# Patient Record
Sex: FEMALE | Race: WHITE | NOT HISPANIC OR LATINO | Employment: OTHER | URBAN - METROPOLITAN AREA
[De-identification: names, ages, dates, MRNs, and addresses within clinical notes are randomized per-mention and may not be internally consistent; named-entity substitution may affect disease eponyms.]

---

## 2018-07-07 ENCOUNTER — HOSPITAL ENCOUNTER (EMERGENCY)
Facility: HOSPITAL | Age: 63
Discharge: HOME | End: 2018-07-07
Attending: EMERGENCY MEDICINE
Payer: MEDICAID

## 2018-07-07 VITALS
TEMPERATURE: 99 F | SYSTOLIC BLOOD PRESSURE: 112 MMHG | OXYGEN SATURATION: 98 % | WEIGHT: 190 LBS | HEART RATE: 76 BPM | DIASTOLIC BLOOD PRESSURE: 56 MMHG | HEIGHT: 63 IN | RESPIRATION RATE: 16 BRPM | BODY MASS INDEX: 33.66 KG/M2

## 2018-07-07 DIAGNOSIS — R41.82 ALTERED MENTAL STATUS: ICD-10-CM

## 2018-07-07 DIAGNOSIS — S32.010D COMPRESSION FRACTURE OF L1 LUMBAR VERTEBRA WITH ROUTINE HEALING: ICD-10-CM

## 2018-07-07 DIAGNOSIS — F11.929: Primary | ICD-10-CM

## 2018-07-07 LAB
ALBUMIN SERPL BCP-MCNC: 3.4 G/DL
ALP SERPL-CCNC: 99 U/L
ALT SERPL W/O P-5'-P-CCNC: 20 U/L
AMMONIA PLAS-SCNC: 55 UMOL/L
ANION GAP SERPL CALC-SCNC: 8 MMOL/L
AST SERPL-CCNC: 30 U/L
BASOPHILS # BLD AUTO: 0.03 K/UL
BASOPHILS NFR BLD: 0.4 %
BILIRUB SERPL-MCNC: 0.6 MG/DL
BILIRUB UR QL STRIP: NEGATIVE
BUN SERPL-MCNC: 30 MG/DL
CALCIUM SERPL-MCNC: 9.6 MG/DL
CHLORIDE SERPL-SCNC: 103 MMOL/L
CLARITY UR: CLEAR
CO2 SERPL-SCNC: 27 MMOL/L
COLOR UR: YELLOW
CREAT SERPL-MCNC: 1.3 MG/DL
DIFFERENTIAL METHOD: ABNORMAL
EOSINOPHIL # BLD AUTO: 0.3 K/UL
EOSINOPHIL NFR BLD: 3.7 %
ERYTHROCYTE [DISTWIDTH] IN BLOOD BY AUTOMATED COUNT: 15.3 %
EST. GFR  (AFRICAN AMERICAN): 51 ML/MIN/1.73 M^2
EST. GFR  (NON AFRICAN AMERICAN): 44 ML/MIN/1.73 M^2
GLUCOSE SERPL-MCNC: 133 MG/DL
GLUCOSE UR QL STRIP: NEGATIVE
HCT VFR BLD AUTO: 33.7 %
HGB BLD-MCNC: 11.1 G/DL
HGB UR QL STRIP: NEGATIVE
KETONES UR QL STRIP: NEGATIVE
LACTATE SERPL-SCNC: 1.1 MMOL/L
LEUKOCYTE ESTERASE UR QL STRIP: NEGATIVE
LYMPHOCYTES # BLD AUTO: 2.2 K/UL
LYMPHOCYTES NFR BLD: 26.5 %
MAGNESIUM SERPL-MCNC: 2.3 MG/DL
MCH RBC QN AUTO: 28.9 PG
MCHC RBC AUTO-ENTMCNC: 32.9 G/DL
MCV RBC AUTO: 88 FL
MICROSCOPIC COMMENT: ABNORMAL
MONOCYTES # BLD AUTO: 0.7 K/UL
MONOCYTES NFR BLD: 8.9 %
NEUTROPHILS # BLD AUTO: 5.1 K/UL
NEUTROPHILS NFR BLD: 60.5 %
NITRITE UR QL STRIP: NEGATIVE
PH UR STRIP: 5 [PH] (ref 5–8)
PLATELET # BLD AUTO: 169 K/UL
PMV BLD AUTO: 10.7 FL
POTASSIUM SERPL-SCNC: 4.7 MMOL/L
PROT SERPL-MCNC: 7.9 G/DL
PROT UR QL STRIP: NEGATIVE
RBC # BLD AUTO: 3.84 M/UL
SODIUM SERPL-SCNC: 138 MMOL/L
SP GR UR STRIP: 1.02 (ref 1–1.03)
SQUAMOUS #/AREA URNS HPF: ABNORMAL /HPF
T4 FREE SERPL-MCNC: 1.25 NG/DL
TSH SERPL DL<=0.005 MIU/L-ACNC: 0.34 UIU/ML
URN SPEC COLLECT METH UR: NORMAL
UROBILINOGEN UR STRIP-ACNC: NEGATIVE EU/DL
WBC # BLD AUTO: 8.34 K/UL
WBC #/AREA URNS HPF: 3 /HPF (ref 0–5)
YEAST URNS QL MICRO: ABNORMAL

## 2018-07-07 PROCEDURE — 83735 ASSAY OF MAGNESIUM: CPT

## 2018-07-07 PROCEDURE — 93005 ELECTROCARDIOGRAM TRACING: CPT

## 2018-07-07 PROCEDURE — 84443 ASSAY THYROID STIM HORMONE: CPT

## 2018-07-07 PROCEDURE — 81000 URINALYSIS NONAUTO W/SCOPE: CPT

## 2018-07-07 PROCEDURE — 83605 ASSAY OF LACTIC ACID: CPT

## 2018-07-07 PROCEDURE — 84439 ASSAY OF FREE THYROXINE: CPT

## 2018-07-07 PROCEDURE — 82140 ASSAY OF AMMONIA: CPT

## 2018-07-07 PROCEDURE — 85025 COMPLETE CBC W/AUTO DIFF WBC: CPT

## 2018-07-07 PROCEDURE — 93010 ELECTROCARDIOGRAM REPORT: CPT | Mod: ,,, | Performed by: INTERNAL MEDICINE

## 2018-07-07 PROCEDURE — 80053 COMPREHEN METABOLIC PANEL: CPT

## 2018-07-07 PROCEDURE — 99285 EMERGENCY DEPT VISIT HI MDM: CPT | Mod: 25

## 2018-07-07 RX ORDER — NAPROXEN SODIUM 220 MG/1
81 TABLET, FILM COATED ORAL DAILY
COMMUNITY

## 2018-07-07 RX ORDER — SPIRONOLACTONE 100 MG/1
100 TABLET, FILM COATED ORAL DAILY
COMMUNITY

## 2018-07-07 RX ORDER — BACLOFEN 20 MG/1
20 TABLET ORAL 3 TIMES DAILY
COMMUNITY

## 2018-07-07 RX ORDER — SIMVASTATIN 40 MG/1
40 TABLET, FILM COATED ORAL NIGHTLY
COMMUNITY

## 2018-07-07 RX ORDER — INSULIN GLARGINE 100 [IU]/ML
INJECTION, SOLUTION SUBCUTANEOUS NIGHTLY
COMMUNITY

## 2018-07-07 RX ORDER — CITALOPRAM 20 MG/1
20 TABLET, FILM COATED ORAL DAILY
COMMUNITY
End: 2020-01-16

## 2018-07-07 RX ORDER — ERGOCALCIFEROL 1.25 MG/1
50000 CAPSULE ORAL
COMMUNITY

## 2018-07-07 RX ORDER — INSULIN LISPRO 100 [IU]/ML
INJECTION, SOLUTION INTRAVENOUS; SUBCUTANEOUS
COMMUNITY

## 2018-07-07 RX ORDER — MORPHINE SULFATE 30 MG/1
30 TABLET ORAL EVERY 4 HOURS PRN
COMMUNITY
End: 2018-07-07 | Stop reason: ALTCHOICE

## 2018-07-07 RX ORDER — DICYCLOMINE HYDROCHLORIDE 10 MG/1
10 CAPSULE ORAL
COMMUNITY

## 2018-07-07 RX ORDER — HYDROMORPHONE HYDROCHLORIDE 2 MG/1
2 TABLET ORAL EVERY 4 HOURS PRN
Qty: 8 TABLET | Refills: 0 | Status: SHIPPED | OUTPATIENT
Start: 2018-07-07

## 2018-07-07 RX ORDER — FUROSEMIDE 40 MG/1
40 TABLET ORAL 2 TIMES DAILY
COMMUNITY

## 2018-07-07 RX ORDER — LACTULOSE 10 G/15ML
SOLUTION ORAL; RECTAL 3 TIMES DAILY
COMMUNITY

## 2018-07-07 NOTE — ED NOTES
Dr. Rawls at bedside, attempted to assist pt out of bed and ambulate. Unsuccessful attempt, pt unable to ambulate. Gait unsteady, family at bedside. Pt assisted back into bed. Cardiac monitor placed.

## 2018-07-07 NOTE — DISCHARGE INSTRUCTIONS
Please begin your sustained release morphine preparation as written at the pharmacy, by .  Please discontinue the 30 mg tablets.  Please use the hydromorphone as needed for breakthrough pain on the new to 15 mg sustained release morphine therapeutic regimen.  Return immediately if she gets worse or if new problems develop.  Rest.

## 2018-07-07 NOTE — ED TRIAGE NOTES
63 y.o. Female presents to the ED with chief complaint of chronic neck and back pain. Per , patient was at [facility] yesterday with no success of pain management. Per EMS, patient took 30 mg of Morphine at home and feels weak. Patient awake and answering questions appropriately with slowed reaction. Per , patient has not been walking for 2 days with associated neuropathy. Patient denies recent injury. Patient resting in bed in [facility].

## 2018-07-07 NOTE — ED NOTES
Spoke with Kaia Car, he will look and fax.   43 y/o F pt with no PMHx and no PSHx presents to ED c/o RLQ abd pain for several days. Pt describes abd pain as constant, and waxing and waning; pt reports no aggravating factors however RLQ abd pain is improved with Motrin. Pt visited both PMD and OB/GYN earlier today and was sent to ED for further evaluation. Pt also reports associated lack of appetite and nausea. Pt notes questionable diarrhea/loose stool. Pt denies fever, chills, vomiting, vaginal bleeding, constipation, dysuria, hematuria, burning with urination, urinary retention, or any other complaints. Pt also denies having had similar episodes of RLQ abd pain in the past. LMP: x9 days ago. NKDA.

## 2018-07-07 NOTE — ED PROVIDER NOTES
"Encounter Date: 7/7/2018    SCRIBE #1 NOTE: I, Frantz Porter, am scribing for, and in the presence of,  Huan Rawls MD. I have scribed the following portions of the note - Other sections scribed: HPI, ROS.       History     Chief Complaint   Patient presents with    Back Pain     complaints of neck and back pain.  took 30 mg Morphine and 2 Baclofen this am. Sees Dr. Banks for pain mgt.   Was seen yesterday at , they gave Narcan.  EMS states pt ambulated w/ assistance to stretcher.  Pt presents to ED on EMS stretcher awake but slow to respond.  Pt will talk with painful stemuli.       CC: Back Pain; Neck Pain    HPI: This 63 y.o female with PMHx Chronic back pain, Cirrhosis, DM and PSHx Back surgery presents to the ED via EMS for an evaluation of chronic back and neck pain. Per EMS, pt was seen at  yesterday, and was given Narcan as tx. They also report pt has taken 30mg Morphine and x2 Baclofen this morning as tx for her sx. Per pt's spouse, pt had x1 fall episode in May, and has been intermittently visiting  since June for her sx. He reports pt is usually ambulatory home until x2 days ago. Pt's spouse also reports pt has sx of intermittent fevers, and had x1 HA episode yesterday. He notes pt is medically compliant, has been using Morphine for x10 years, changed from Norco in the past, and also mentions pt's sx of twitching x5 years.  No modifying factors. Otherwise, pt denies SOB, CP, abdominal pain, and N/V/D.       The history is provided by the patient, the spouse and the EMS personnel. No  was used.     Review of patient's allergies indicates:   Allergen Reactions    Lyrica [pregabalin] Other (See Comments)     "it makes her crazy" per spouse    Mushroom     Protonix [pantoprazole] Other (See Comments)     "it makes her crazy" per spouse.     Past Medical History:   Diagnosis Date    Chronic back pain 07/07/2018    Cirrhosis 07/07/2018    Diabetes mellitus      Past Surgical " History:   Procedure Laterality Date    BACK SURGERY       History reviewed. No pertinent family history.  Social History   Substance Use Topics    Smoking status: Former Smoker    Smokeless tobacco: Never Used    Alcohol use No     Review of Systems   Constitutional: Positive for fever (+) intermittent. Negative for chills.   HENT: Negative for congestion, ear pain, rhinorrhea and sore throat.    Eyes: Negative for pain and visual disturbance.   Respiratory: Negative for cough and shortness of breath.    Cardiovascular: Positive for leg swelling. Negative for chest pain.   Gastrointestinal: Negative for abdominal pain, diarrhea, nausea and vomiting.   Genitourinary: Negative for dysuria.   Musculoskeletal: Positive for back pain and neck pain.   Skin: Negative for rash.   Neurological: Negative for headaches.   All other systems reviewed and are negative.      Physical Exam     Initial Vitals [07/07/18 0758]   BP Pulse Resp Temp SpO2   (!) 175/75 78 14 97.3 °F (36.3 °C) 99 %      MAP       --         Physical Exam  The patient was examined specifically for the following:   General:No significant distress, Good color, Warm and dry. Head and neck:Scalp atraumatic, Neck supple. Neurological:Appropriate conversation, Gross motor deficits. Eyes:Conjugate gaze, Clear corneas. ENT: No epistaxis. Cardiac: Regular rate and rhythm, Grossly normal heart tones. Pulmonary: Wheezing, Rales. Gastrointestinal: Abdominal tenderness, Abdominal distention. Musculoskeletal: Extremity deformity, Apparent pain with range of motion of the joints. Skin: Rash.   The findings on examination were normal except for the following:  Patient is slow to respond.  That time she seems confused.  She will converse appropriately.  Her eyes are wide open.  She will tract examiner with some stimulation.  Cranial nerves, motor and sensory examination grossly unremarkable.  The lungs are clear the heart tones are normal the abdomen is nontender there  is mild tenderness of the lumbar back.  We can sit the patient upright in the stretcher.  Patient will move her neck spontaneously without complaint.  The patient is oriented to being at [place].  There is no jaundice.  The there is no ascites.  The patient has a midline ventral hernia that is easily reducible.  It is a large defect.  ED Course   Procedures  Labs Reviewed   COMPREHENSIVE METABOLIC PANEL - Abnormal; Notable for the following:        Result Value    Glucose 133 (*)     BUN, Bld 30 (*)     Albumin 3.4 (*)     eGFR if  51 (*)     eGFR if non  44 (*)     All other components within normal limits   CBC W/ AUTO DIFFERENTIAL - Abnormal; Notable for the following:     RBC 3.84 (*)     Hemoglobin 11.1 (*)     Hematocrit 33.7 (*)     RDW 15.3 (*)     All other components within normal limits   TSH - Abnormal; Notable for the following:     TSH 0.343 (*)     All other components within normal limits   AMMONIA - Abnormal; Notable for the following:     Ammonia 55 (*)     All other components within normal limits   URINALYSIS MICROSCOPIC - Abnormal; Notable for the following:     Yeast, UA Many (*)     All other components within normal limits    Narrative:     Preferred Collection Type->Urine, Clean Catch   MAGNESIUM   URINALYSIS, REFLEX TO URINE CULTURE    Narrative:     Preferred Collection Type->Urine, Clean Catch   LACTIC ACID, PLASMA   T4, FREE     EKG Readings: (Independently Interpreted)   This patient is in a normal sinus rhythm with a heart rate of 79.  The p.r. QRS and QT intervals are normal.  There is no evidence of acute myocardial infarction or malignant arrhythmia.       Imaging Results          CT Head Without Contrast (Final result)  Result time 07/07/18 09:15:33    Final result by Rachele Roman MD (07/07/18 09:15:33)                 Impression:      Unremarkable noncontrast CT head specifically without evidence for acute intracranial hemorrhage.   Clinical correlation and further evaluation as warranted.      Electronically signed by: Rachele Roman MD  Date:    07/07/2018  Time:    09:15             Narrative:    EXAMINATION:  CT HEAD WITHOUT CONTRAST    CLINICAL HISTORY:  Altered mental status;  Altered mental status, unspecified    TECHNIQUE:  Multiple sequential 5 mm axial images of the head without contrast.  Coronal and sagittal reformatted imaging from the axial acquisition.    COMPARISON:  None    FINDINGS:  There is no evidence for acute intracranial hemorrhage or sulcal effacement.  The ventricles are normal in size without hydrocephalus.  There is no midline shift or mass effect.  Visualized paranasal sinuses and mastoid air cells are clear.                               CT Lumbar Spine Without Contrast (Final result)  Result time 07/07/18 09:39:35    Final result by Rachele Roman MD (07/07/18 09:39:35)                 Impression:      Age-indeterminate compression fracture of L1 with loss of approximately 40% superior vertebral body height and retropulsion of the superior endplate into the spinal canal by approximately 6 mm.  Comparison with prior imaging would be beneficial.    Postoperative changes of the L4 and L5 level consisting of posterior decompression.    Multilevel degenerative changes of the lumbar spine contributing to central canal stenosis and neural foraminal narrowing as detailed in the above level by level description.      Electronically signed by: Rachele Roman MD  Date:    07/07/2018  Time:    09:39             Narrative:    EXAMINATION:  CT LUMBAR SPINE WITHOUT CONTRAST    CLINICAL HISTORY:  Low back pain, prior surgery, new or progressive sx;    TECHNIQUE:  Low-dose axial, sagittal and coronal reformations are obtained through the lumbar spine.  Contrast was not administered.    COMPARISON:  None.    FINDINGS:  Collateral vessels are noted in the left upper abdomen.  Calcified atheromatous disease affects the aorta and  its branch vessels.  Epidural stimulator device is partially imaged.  It enters the spine at the T12-L1 level.    There is an age-indeterminate compression fracture of L1 with loss of approximately 40% superior vertebral body height.  There is retropulsion of the superior endplate into the spinal canal by approximately 6 mm.  Remaining vertebral body heights are within normal limits.  There is disc space narrowing at L4-5.  Remaining disc spaces are within normal limits.    At T12-L1, there is retropulsion of the superior endplate of L1 into the spinal canal with a broad-based posterior disc bulge and facet arthropathy contributing to mild-to-moderate central canal stenosis and mild bilateral neural foraminal narrowing, worse on the left.    At L1-2, there is a broad-based posterior disc bulge and facet arthropathy contributing to mild-to-moderate central canal stenosis with mild left and moderate right-sided neural foraminal narrowing.    At L2-3, there is a broad-based posterior disc bulge and facet arthropathy contributing to mild central canal stenosis with mild bilateral neural foraminal narrowing.    At L3-4, there is a broad-based posterior disc bulge and facet arthropathy contributing to moderate central canal stenosis with mild bilateral neural foraminal narrowing.    At L4-5, posterior decompression is noted.  There is bilateral facet arthropathy.  No central canal stenosis.  Moderate right-sided neural foraminal narrowing.    At L5-S1, posterior decompression is noted.  There is bilateral facet arthropathy contributing to at most mild bilateral neural foraminal narrowing.                               X-Ray Chest AP Portable (Final result)  Result time 07/07/18 08:48:31    Final result by Rachele Roman MD (07/07/18 08:48:31)                 Impression:      Mild pulmonary vascular congestion and cardiomegaly.      Electronically signed by: Rachele Roman MD  Date:    07/07/2018  Time:    08:48              Narrative:    EXAMINATION:  XR CHEST AP PORTABLE    CLINICAL HISTORY:  chest pain;    TECHNIQUE:  Single frontal view of the chest was performed.    COMPARISON:  None    FINDINGS:  The heart is enlarged.  Calcified atheromatous disease affects the aorta.  There is mild central pulmonary vascular congestion.  No consolidation or pleural effusions.  Cervical fusion hardware is visualized.  Leads from an epidural stimulator device travel of the thoracic spine.  Age-appropriate degenerative changes affect the skeleton.                              Medical decision making:  Given the above, this patient presents to the emergency room with mental status depression.  There at [place] yesterday after the patient was treated with Narcan.  The patient had immediate difficulty with withdrawal for medicine.  The family was angry about this.  They present today for another opinion about the patient's somnolence and inability to walk.  I can wake this patient off.  I can have conversations with her.  She is confused.  The family relates that she has had baseline other than looking fuzzy.  She has occasional myoclonic jerks.  She has good oxygen saturations.  She has been written a new prescription by her pain management doctor, Dr. cortez, who has moved her to morphine same believes, 15 mg at night before bed.  The pharmacy could not fill these medicines because they did not have an adequate supply.  I was able to arrange for the family to be able to  40 of her 60 tablets prescription.  We will discontinue the morphine 30s.  I discussed this case with  specifically with regard to the patient's abnormal CT scan with findings at L1.  He did not feel that there was compromise of the canal.  The patient had some mild findings suggesting some minimal dehydration but at the same time she has pedal edema. I thought that it was in her best interest to not treat her with IV fluids.  We will have her follow  up with her pain management doctor after the changes made.  I will write her a small number of hydromorphone tablets to cover her in the event that she has a little breakthrough pain. I will avoid Tylenol because of her history of cirrhosis.  I could not find another answer for her mental status changes besides narcotic intoxication.  I do not think the patient is septic ill or toxic.  I doubt structural brain lesions and stroke.  The case was discussed with , internal medicine who agrees with the disposition and plan.                Scribe Attestation:   Scribe #1: I performed the above scribed service and the documentation accurately describes the services I performed. I attest to the accuracy of the note.    Attending Attestation:           Physician Attestation for Scribe:  Physician Attestation Statement for Scribe #1: I, Huan Rawls MD, reviewed documentation, as scribed by Frantz Porter in my presence, and it is both accurate and complete.                    Clinical Impression:   The primary encounter diagnosis was Acute narcotic intoxication, with unspecified complication. A diagnosis of Altered mental status was also pertinent to this visit.                             Huan Rawls MD  07/07/18 2430

## 2018-09-17 ENCOUNTER — HOSPITAL ENCOUNTER (EMERGENCY)
Facility: HOSPITAL | Age: 63
Discharge: HOME | End: 2018-09-17
Attending: EMERGENCY MEDICINE
Payer: MEDICAID

## 2018-09-17 VITALS
HEIGHT: 61 IN | RESPIRATION RATE: 20 BRPM | TEMPERATURE: 98 F | WEIGHT: 204 LBS | HEART RATE: 64 BPM | DIASTOLIC BLOOD PRESSURE: 61 MMHG | BODY MASS INDEX: 38.51 KG/M2 | OXYGEN SATURATION: 97 % | SYSTOLIC BLOOD PRESSURE: 128 MMHG

## 2018-09-17 DIAGNOSIS — D64.9 ANEMIA, UNSPECIFIED TYPE: Primary | ICD-10-CM

## 2018-09-17 DIAGNOSIS — R07.9 CHEST PAIN: ICD-10-CM

## 2018-09-17 LAB
ALBUMIN SERPL BCP-MCNC: 3 G/DL
ALP SERPL-CCNC: 102 U/L
ALT SERPL W/O P-5'-P-CCNC: 10 U/L
ANION GAP SERPL CALC-SCNC: 5 MMOL/L
AST SERPL-CCNC: 20 U/L
BASOPHILS # BLD AUTO: 0.01 K/UL
BASOPHILS NFR BLD: 0.2 %
BILIRUB SERPL-MCNC: 0.3 MG/DL
BNP SERPL-MCNC: 72 PG/ML
BUN SERPL-MCNC: 12 MG/DL
CALCIUM SERPL-MCNC: 8.5 MG/DL
CHLORIDE SERPL-SCNC: 107 MMOL/L
CO2 SERPL-SCNC: 27 MMOL/L
CREAT SERPL-MCNC: 1.2 MG/DL
CTP QC/QA: YES
DIFFERENTIAL METHOD: ABNORMAL
EOSINOPHIL # BLD AUTO: 0.3 K/UL
EOSINOPHIL NFR BLD: 5 %
ERYTHROCYTE [DISTWIDTH] IN BLOOD BY AUTOMATED COUNT: 14.7 %
EST. GFR  (AFRICAN AMERICAN): 56 ML/MIN/1.73 M^2
EST. GFR  (NON AFRICAN AMERICAN): 49 ML/MIN/1.73 M^2
FECAL OCCULT BLOOD, POC: NEGATIVE
GLUCOSE SERPL-MCNC: 177 MG/DL
HCT VFR BLD AUTO: 27.5 %
HGB BLD-MCNC: 8.9 G/DL
LYMPHOCYTES # BLD AUTO: 1.7 K/UL
LYMPHOCYTES NFR BLD: 31.6 %
MCH RBC QN AUTO: 28.3 PG
MCHC RBC AUTO-ENTMCNC: 32.4 G/DL
MCV RBC AUTO: 88 FL
MONOCYTES # BLD AUTO: 0.4 K/UL
MONOCYTES NFR BLD: 7.7 %
NEUTROPHILS # BLD AUTO: 3 K/UL
NEUTROPHILS NFR BLD: 55.5 %
PLATELET # BLD AUTO: 134 K/UL
PMV BLD AUTO: 11.6 FL
POTASSIUM SERPL-SCNC: 4.7 MMOL/L
PROT SERPL-MCNC: 7 G/DL
RBC # BLD AUTO: 3.14 M/UL
SODIUM SERPL-SCNC: 139 MMOL/L
TROPONIN I SERPL DL<=0.01 NG/ML-MCNC: 0.01 NG/ML
TROPONIN I SERPL DL<=0.01 NG/ML-MCNC: <0.006 NG/ML
WBC # BLD AUTO: 5.44 K/UL

## 2018-09-17 PROCEDURE — 85025 COMPLETE CBC W/AUTO DIFF WBC: CPT

## 2018-09-17 PROCEDURE — 84484 ASSAY OF TROPONIN QUANT: CPT

## 2018-09-17 PROCEDURE — 80053 COMPREHEN METABOLIC PANEL: CPT

## 2018-09-17 PROCEDURE — 93010 ELECTROCARDIOGRAM REPORT: CPT | Mod: ,,, | Performed by: INTERNAL MEDICINE

## 2018-09-17 PROCEDURE — 83880 ASSAY OF NATRIURETIC PEPTIDE: CPT

## 2018-09-17 PROCEDURE — 99284 EMERGENCY DEPT VISIT MOD MDM: CPT

## 2018-09-17 PROCEDURE — 93005 ELECTROCARDIOGRAM TRACING: CPT

## 2018-09-17 RX ORDER — MIDAZOLAM HYDROCHLORIDE 1 MG/ML
2 INJECTION INTRAMUSCULAR; INTRAVENOUS
Status: DISCONTINUED | OUTPATIENT
Start: 2018-09-17 | End: 2018-09-17

## 2018-09-18 NOTE — ED TRIAGE NOTES
"Patient presents to the ED via personal transportation with . Patient reports that she went to see her PCP today and red blood cell was low. Patient is also sob and chest pain. Sob x "2-3 weeks ago". Chest pain has also been going on for "2-3 weeks". Patient reports a 6/10, mid sternal, intermittent, pressure-like chest pain that does not radiate and gets exertion. Patient also reports vomiting episodes that is chronic.  "

## 2018-09-18 NOTE — ED TRIAGE NOTES
Presented to ED with c/o  midsternal CP, SOB, and abnormal lab results. Stated she went to her PCP today to hear her results about the H/H she had last week. Her PCP told her to come to an emergency room. Says she's noticed she's even more shortwinded when she's active. Normally wears 2L home 02.  Pressure-like CP does not radiate anywhere. Denies nausea associated with cp, but c/o dizziness.

## 2018-09-18 NOTE — DISCHARGE INSTRUCTIONS
"Return to the Emergency Department of any acute worsening of your symptoms or for any other concern.     You should return to the ED for fever/chills, shortness of breath, chest pain, weakness or "passing out".     Pt should take all medications as prescribed.    Pt should follow up with PCP as soon as possible.    The risks associated with not taking your medications as prescribed and not following up with your Primary Care doctor or sub specialist includes worsening of your condition, pain, disability, loss of function or livelihood, and death      GEORGI Dobbs M.D. 11:16 PM 9/17/2018      Our goal in the emergency department is to always give you outstanding care and exceptional service. You may receive a survey by mail or e-mail in the next week regarding your experience in our ED. We would greatly appreciate your completing and returning the survey. Your feedback provides us with a way to recognize our staff who give very good care and it helps us learn how to improve when your experience was below our aspiration of excellence.     "

## 2018-09-18 NOTE — ED PROVIDER NOTES
"Encounter Date: 9/17/2018    SCRIBE #1 NOTE: I, Rodríguez Dial, am scribing for, and in the presence of,  Bhavin Dobbs MD. I have scribed the following portions of the note - Other sections scribed: HPI and ROS.       History     Chief Complaint   Patient presents with    Chest Pain     Pt reports midsternal CP that began 3-4 weeks, pt stated hat she feels like someone is "standing on her chest"    Abnormal Lab     pt stated that PCP told her her RBC count was low and wanted her to be evaluated in ED     CC: Palpitations and Abnormal Lab     HPI: This 63 y.o F with Chronic back pain, Cirrhosis, and DM presents to the ED for emergent evaluation of an abnormal lab. She was told to come to the ED by her PCP for low RBC. Pt's most recent lab work was performed last week. Additionally, she c/o palpitations described as "chest pounding" and SOB with minimal exertion x3-4 weeks. She describes her episodes as "like someone is sitting on my chest." She also c/o constipation and bright red blood in stool which she suspects is secondary to straining. She is compliant with her Furosemide. She denies fever, chills, diaphoresis, nausea, emesis, diarrhea, abdominal pain, back pain, dysuria, hematuria and rash. No prior tx.         The history is provided by the patient and the spouse. No  was used.     Review of patient's allergies indicates:   Allergen Reactions    Lyrica [pregabalin] Other (See Comments)     "it makes her crazy" per spouse    Mushroom     Protonix [pantoprazole] Other (See Comments)     "it makes her crazy" per spouse.     Past Medical History:   Diagnosis Date    Chronic back pain 07/07/2018    Cirrhosis 07/07/2018    Diabetes mellitus      Past Surgical History:   Procedure Laterality Date    BACK SURGERY       History reviewed. No pertinent family history.  Social History     Tobacco Use    Smoking status: Former Smoker    Smokeless tobacco: Never Used   Substance Use " "Topics    Alcohol use: No    Drug use: No     Review of Systems   Constitutional: Negative for chills, diaphoresis and fever.   HENT: Negative for congestion, ear pain, rhinorrhea and sore throat.    Eyes: Negative for pain, redness and visual disturbance.   Respiratory: Positive for shortness of breath. Negative for cough.    Cardiovascular: Positive for palpitations ("pounding"). Negative for chest pain.   Gastrointestinal: Positive for blood in stool (bright red) and constipation. Negative for abdominal pain, diarrhea, nausea and vomiting.   Genitourinary: Negative for dysuria, frequency and urgency.   Musculoskeletal: Negative for back pain and neck pain.   Skin: Negative for rash.   Neurological: Negative for headaches.   Psychiatric/Behavioral: The patient is not nervous/anxious.        Physical Exam     Initial Vitals [09/17/18 1825]   BP Pulse Resp Temp SpO2   (!) 136/58 62 16 98.8 °F (37.1 °C) (!) 93 %      MAP       --         Physical Exam    Vitals reviewed.  Constitutional: She appears well-developed and well-nourished. She is Obese .   HENT:   Head: Normocephalic and atraumatic.   Nose: Nose normal.   Mouth/Throat: No oropharyngeal exudate.   Eyes: EOM are normal. Pupils are equal, round, and reactive to light.   Neck: Normal range of motion. Neck supple. No JVD present.   Cardiovascular: Regular rhythm and normal heart sounds. Exam reveals no gallop and no friction rub.    No murmur heard.  Pulmonary/Chest: Breath sounds normal. No stridor. No respiratory distress. She has no wheezes. She has no rhonchi. She has no rales. She exhibits no tenderness.   Abdominal: Soft. Bowel sounds are normal. She exhibits no distension and no mass. There is no tenderness. There is no rebound and no guarding.   Genitourinary: Rectal exam shows no external hemorrhoid, no internal hemorrhoid, no fissure, no mass, no tenderness, anal tone normal and guaiac negative stool. Guaiac negative stool. : " Acceptable.  Genitourinary Comments: Chaperoned rectal exam.  No sign of active bleeding.  No sign of hemorrhoids.  Light brown stool.  Occult testing negative.   Musculoskeletal: Normal range of motion. She exhibits no edema or tenderness.   Neurological: She is alert and oriented to person, place, and time. She has normal strength. No sensory deficit.   Skin: Skin is warm and dry.   Psychiatric: She has a normal mood and affect. Thought content normal.         ED Course   Procedures  Labs Reviewed   CBC W/ AUTO DIFFERENTIAL - Abnormal; Notable for the following components:       Result Value    RBC 3.14 (*)     Hemoglobin 8.9 (*)     Hematocrit 27.5 (*)     RDW 14.7 (*)     Platelets 134 (*)     All other components within normal limits   COMPREHENSIVE METABOLIC PANEL - Abnormal; Notable for the following components:    Glucose 177 (*)     Calcium 8.5 (*)     Albumin 3.0 (*)     Anion Gap 5 (*)     eGFR if  56 (*)     eGFR if non  49 (*)     All other components within normal limits   TROPONIN I   TROPONIN I   B-TYPE NATRIURETIC PEPTIDE   POCT OCCULT BLOOD STOOL     EKG Readings: (Independently Interpreted)   Initial Reading: No STEMI. Rhythm: Normal Sinus Rhythm. Ectopy: No Ectopy. Conduction: Normal. ST Segments: Normal ST Segments. T Waves: Normal. Clinical Impression: Normal Sinus Rhythm       Imaging Results          X-Ray Chest PA And Lateral (Final result)  Result time 09/17/18 21:10:47    Final result by Harvinder Block MD (09/17/18 21:10:47)                 Impression:      Enlarged cardiac silhouette without convincing radiographic evidence of failure or focal consolidation.      Electronically signed by: Harvinder Block MD  Date:    09/17/2018  Time:    21:10             Narrative:    EXAMINATION:  XR CHEST PA AND LATERAL    CLINICAL HISTORY:  Chest Pain;    TECHNIQUE:  PA and lateral views of the chest were performed.    COMPARISON:  Chest radiograph  07/07/2018    FINDINGS:  Monitoring leads overlie the chest.  Lower cervical ACDF and thoracic spinal stimulator noted.  Cardiac silhouette is enlarged without convincing evidence of failure.  Calcific atherosclerosis of the aortic arch.  Mediastinal contours and hilar regions are within normal limits.  The lungs are symmetrically well expanded without large consolidation, pleural effusion or pneumothorax.  Left basilar minimal platelike scarring versus atelectasis.  No acute osseous process seen.                              X-Rays:   Independently Interpreted Readings:   Chest X-Ray: No infiltrates.  No acute abnormalities. Cardiomegaly present.     Medical Decision Making:   History:   Old Medical Records: I decided to obtain old medical records.  Initial Assessment:   Medical decision-making:    The patient received a medical screening exam. If performed, the EKG was independently evaluated by me and is pending final cardiology evaluation.  If performed, all radiographic studies were independently evaluated by me and are pending final radiology evaluation. If labs were ordered, they were reviewed. Vital signs are independently assessed by me.  If performed, the pulse oximetry was independently evaluated by me.  I decided to obtain the patient's past medical record.  If available, I reviewed the patient's past medical record, including most recent labs and radiology reports.    ED Management:  Patient presents to emergency department for evaluation of chest pain and reported abnormal labs.  The patient has a normal EKG without evidence of ischemia.  This is been going on for 3-4 weeks.  I doubt acute myocardial infarction.  Troponins are negative x2.  There is no sign of severe vascular congestion or severe hypoxia or to suggest acute decompensated heart failure warranting admission for IV diuresis.  The family admits that they have not a did and hearing to a low-salt diet and have recently had a shrimp boil.   This is likely some volume overload creating some central congestion without any severe decompensation or peripheral vascular congestion.  Have encouraged her to adhere to a no salt diet and to take her Lasix as prescribed.  I do not think this is a pulmonary embolism.  No sign of DVT.  Patient did state that she was told by her primary care physician that she was somewhat anemic.  She has had a drop in her hemoglobin and hematocrit.  She states this is usually due to bright red blood when she has to strain to have a bowel movement.  I do not see any active sign of external or internal hemorrhoid.  Digital rectal exam showed light brown stool that was occult testing negative.  There is no other sign of bleeding diathesis.  She does not require blood transfusion.  She states that she has had polyps in the past on colonoscopy about a year ago.  This could be a diverticular polyp type bleed.  I will refer her back to Gastroenterology.  She does not require blood transfusion at this time.  Patient and family feels reassured and we discussed the importance of having low-salt diet and we discussed strict return precautions.  At the time of discharge patient is pain free and without any acute complaints.    The results and physical exam findings were reviewed with the patient. Pt agrees with assessment, disposition and treatment plan and has no further questions or complaints at this time.    GEORGI Dobbs M.D. 11:23 PM 9/17/2018              Scribe Attestation:   Scribe #1: I performed the above scribed service and the documentation accurately describes the services I performed. I attest to the accuracy of the note.    Attending Attestation:           Physician Attestation for Scribe:  Physician Attestation Statement for Scribe #1: I, Bhavin Dobbs MD, reviewed documentation, as scribed by Rodríguez Dial in my presence, and it is both accurate and complete.                    Clinical Impression:   The primary  encounter diagnosis was Anemia, unspecified type. A diagnosis of Atypical was also pertinent to this visit.                             Bhavin Dobbs MD  09/17/18 [id number]

## 2020-01-16 ENCOUNTER — HOSPITAL ENCOUNTER (EMERGENCY)
Facility: HOSPITAL | Age: 65
Discharge: HOME | End: 2020-01-16
Attending: INTERNAL MEDICINE
Payer: MEDICAID

## 2020-01-16 VITALS
RESPIRATION RATE: 20 BRPM | SYSTOLIC BLOOD PRESSURE: 167 MMHG | WEIGHT: 210 LBS | HEART RATE: 71 BPM | OXYGEN SATURATION: 96 % | DIASTOLIC BLOOD PRESSURE: 71 MMHG | BODY MASS INDEX: 39.68 KG/M2 | TEMPERATURE: 100 F

## 2020-01-16 DIAGNOSIS — I50.9 CHF (CONGESTIVE HEART FAILURE): ICD-10-CM

## 2020-01-16 DIAGNOSIS — J98.4 CHRONIC LUNG DISEASE: ICD-10-CM

## 2020-01-16 DIAGNOSIS — Z79.4 TYPE 2 DIABETES MELLITUS WITH HYPERGLYCEMIA, WITH LONG-TERM CURRENT USE OF INSULIN: ICD-10-CM

## 2020-01-16 DIAGNOSIS — E11.65 TYPE 2 DIABETES MELLITUS WITH HYPERGLYCEMIA, WITH LONG-TERM CURRENT USE OF INSULIN: ICD-10-CM

## 2020-01-16 DIAGNOSIS — J18.9 PNEUMONIA OF RIGHT LOWER LOBE DUE TO INFECTIOUS ORGANISM: Primary | ICD-10-CM

## 2020-01-16 DIAGNOSIS — R05.9 COUGH: ICD-10-CM

## 2020-01-16 LAB
ALBUMIN SERPL-MCNC: 3.2 G/DL (ref 3.3–5.5)
ALLENS TEST: ABNORMAL
ALP SERPL-CCNC: 94 U/L (ref 42–141)
BILIRUB SERPL-MCNC: 0.6 MG/DL (ref 0.2–1.6)
BUN SERPL-MCNC: 17 MG/DL (ref 7–22)
CALCIUM SERPL-MCNC: 8.3 MG/DL (ref 8–10.3)
CHLORIDE SERPL-SCNC: 107 MMOL/L (ref 98–108)
CREAT SERPL-MCNC: 0.9 MG/DL (ref 0.6–1.2)
CTP QC/QA: YES
GLUCOSE SERPL-MCNC: 143 MG/DL (ref 73–118)
HCO3 UR-SCNC: 26.7 MMOL/L (ref 24–28)
LDH SERPL L TO P-CCNC: 0.87 MMOL/L (ref 0.5–2.2)
PCO2 BLDA: 56.5 MMHG (ref 35–45)
PH SMN: 7.28 [PH] (ref 7.35–7.45)
PO2 BLDA: 125 MMHG (ref 40–60)
POC ALT (SGPT): 22 U/L (ref 10–47)
POC AST (SGOT): 38 U/L (ref 11–38)
POC B-TYPE NATRIURETIC PEPTIDE: 286 PG/ML (ref 0–100)
POC BE: -1 MMOL/L
POC CARDIAC TROPONIN I: 0 NG/ML
POC MOLECULAR INFLUENZA A AGN: NEGATIVE
POC MOLECULAR INFLUENZA B AGN: NEGATIVE
POC SATURATED O2: 98 % (ref 95–100)
POC TCO2: 26 MMOL/L (ref 18–33)
POC TCO2: 28 MMOL/L (ref 24–29)
POCT GLUCOSE: 173 MG/DL (ref 70–110)
POTASSIUM BLD-SCNC: 4.8 MMOL/L (ref 3.6–5.1)
PROTEIN, POC: 7.4 G/DL (ref 6.4–8.1)
SAMPLE: ABNORMAL
SAMPLE: NORMAL
SITE: ABNORMAL
SODIUM BLD-SCNC: 144 MMOL/L (ref 128–145)

## 2020-01-16 PROCEDURE — 80053 COMPREHEN METABOLIC PANEL: CPT | Mod: ER

## 2020-01-16 PROCEDURE — 96365 THER/PROPH/DIAG IV INF INIT: CPT | Mod: ER

## 2020-01-16 PROCEDURE — 96375 TX/PRO/DX INJ NEW DRUG ADDON: CPT | Mod: ER

## 2020-01-16 PROCEDURE — 87040 BLOOD CULTURE FOR BACTERIA: CPT

## 2020-01-16 PROCEDURE — 93010 EKG 12-LEAD: ICD-10-PCS | Mod: ,,, | Performed by: INTERNAL MEDICINE

## 2020-01-16 PROCEDURE — 84484 ASSAY OF TROPONIN QUANT: CPT | Mod: ER

## 2020-01-16 PROCEDURE — 93010 ELECTROCARDIOGRAM REPORT: CPT | Mod: ,,, | Performed by: INTERNAL MEDICINE

## 2020-01-16 PROCEDURE — 85025 COMPLETE CBC W/AUTO DIFF WBC: CPT | Mod: ER

## 2020-01-16 PROCEDURE — 63600175 PHARM REV CODE 636 W HCPCS: Mod: ER | Performed by: NURSE PRACTITIONER

## 2020-01-16 PROCEDURE — 82803 BLOOD GASES ANY COMBINATION: CPT | Mod: ER

## 2020-01-16 PROCEDURE — 25000242 PHARM REV CODE 250 ALT 637 W/ HCPCS: Mod: ER | Performed by: NURSE PRACTITIONER

## 2020-01-16 PROCEDURE — 94640 AIRWAY INHALATION TREATMENT: CPT | Mod: ER

## 2020-01-16 PROCEDURE — 87502 INFLUENZA DNA AMP PROBE: CPT | Mod: 59,ER

## 2020-01-16 PROCEDURE — 99285 EMERGENCY DEPT VISIT HI MDM: CPT | Mod: 25,ER

## 2020-01-16 PROCEDURE — 93005 ELECTROCARDIOGRAM TRACING: CPT | Mod: ER

## 2020-01-16 PROCEDURE — 83880 ASSAY OF NATRIURETIC PEPTIDE: CPT | Mod: ER

## 2020-01-16 RX ORDER — LEVOFLOXACIN 750 MG/1
750 TABLET ORAL DAILY
Qty: 5 TABLET | Refills: 0 | Status: SHIPPED | OUTPATIENT
Start: 2020-01-17 | End: 2020-01-22

## 2020-01-16 RX ORDER — IPRATROPIUM BROMIDE AND ALBUTEROL SULFATE 2.5; .5 MG/3ML; MG/3ML
3 SOLUTION RESPIRATORY (INHALATION)
Status: COMPLETED | OUTPATIENT
Start: 2020-01-16 | End: 2020-01-16

## 2020-01-16 RX ORDER — FUROSEMIDE 10 MG/ML
40 INJECTION INTRAMUSCULAR; INTRAVENOUS
Status: COMPLETED | OUTPATIENT
Start: 2020-01-16 | End: 2020-01-16

## 2020-01-16 RX ORDER — LEVOFLOXACIN 5 MG/ML
750 INJECTION, SOLUTION INTRAVENOUS
Status: COMPLETED | OUTPATIENT
Start: 2020-01-16 | End: 2020-01-16

## 2020-01-16 RX ORDER — BENZONATATE 100 MG/1
100 CAPSULE ORAL EVERY 8 HOURS PRN
Qty: 20 CAPSULE | Refills: 0 | Status: SHIPPED | OUTPATIENT
Start: 2020-01-16

## 2020-01-16 RX ADMIN — CEFTRIAXONE 1 G: 1 INJECTION, SOLUTION INTRAVENOUS at 04:01

## 2020-01-16 RX ADMIN — FUROSEMIDE 40 MG: 10 INJECTION, SOLUTION INTRAMUSCULAR; INTRAVENOUS at 04:01

## 2020-01-16 RX ADMIN — IPRATROPIUM BROMIDE AND ALBUTEROL SULFATE 3 ML: .5; 3 SOLUTION RESPIRATORY (INHALATION) at 04:01

## 2020-01-16 RX ADMIN — LEVOFLOXACIN 750 MG: 750 INJECTION, SOLUTION INTRAVENOUS at 04:01

## 2020-01-16 NOTE — ED PROVIDER NOTES
"Encounter Date: 1/16/2020    SCRIBE #1 NOTE: I, Prabhu Myers, am scribing for, and in the presence of,  Toussaint Battley, FNP. I have scribed the following portions of the note - Other sections scribed: HPI, ROS, PE.       History     Chief Complaint   Patient presents with    URI     Pt states, "Cough, congestion for several days."     The history is provided by the patient. No  was used.   Cough   This is a new problem. The current episode started several days ago. The problem occurs constantly. The problem has been unchanged. There has been no fever. Associated symptoms include wheezing. Pertinent negatives include no chest pain, no chills, no sweats, no weight loss, no headaches, no rhinorrhea, no sore throat, no myalgias, no shortness of breath and no eye redness. She has tried nothing for the symptoms. Risk factors: History of chronic lung disease.  Denies history of COPD. Past medical history comments: Patient was seen by her primary care provider on today who referred her to the ER for further evaluation for possible pneumonia..     Review of patient's allergies indicates:   Allergen Reactions    Lyrica [pregabalin] Other (See Comments)     "it makes her crazy" per spouse    Mushroom     Protonix [pantoprazole] Other (See Comments)     "it makes her crazy" per spouse.     Past Medical History:   Diagnosis Date    Chronic back pain 07/07/2018    Cirrhosis 07/07/2018    Diabetes mellitus      Past Surgical History:   Procedure Laterality Date    BACK SURGERY       History reviewed. No pertinent family history.  Social History     Tobacco Use    Smoking status: Former Smoker    Smokeless tobacco: Never Used   Substance Use Topics    Alcohol use: No    Drug use: No     Review of Systems   Constitutional: Negative.  Negative for appetite change, chills, diaphoresis, fever and weight loss.   HENT: Positive for congestion. Negative for dental problem, ear discharge, hearing loss, " mouth sores, postnasal drip, rhinorrhea, sinus pressure, sinus pain, sore throat and trouble swallowing.    Eyes: Negative.  Negative for pain, discharge, redness and itching.   Respiratory: Positive for cough and wheezing. Negative for shortness of breath.    Cardiovascular: Negative.  Negative for chest pain and palpitations.   Gastrointestinal: Negative.  Negative for abdominal distention, abdominal pain, constipation, diarrhea, nausea, rectal pain and vomiting.   Endocrine: Negative.    Genitourinary: Negative.  Negative for difficulty urinating, dyspareunia, dysuria, flank pain, hematuria, menstrual problem, vaginal bleeding, vaginal discharge and vaginal pain.   Musculoskeletal: Negative.  Negative for back pain, myalgias and neck pain.   Skin: Negative.  Negative for rash.   Allergic/Immunologic: Negative.    Neurological: Negative.  Negative for dizziness, syncope, facial asymmetry, speech difficulty, weakness, light-headedness and headaches.   Hematological: Negative.  Negative for adenopathy.   Psychiatric/Behavioral: Negative.  Negative for agitation, behavioral problems, dysphoric mood, hallucinations, self-injury, sleep disturbance and suicidal ideas.   All other systems reviewed and are negative.      Physical Exam     Initial Vitals [01/16/20 1454]   BP Pulse Resp Temp SpO2   (!) 156/67 61 16 99.5 °F (37.5 °C) (!) 94 %      MAP       --         Physical Exam    Nursing note and vitals reviewed.  Constitutional: Vital signs are normal. She appears well-developed and well-nourished.   HENT:   Head: Normocephalic and atraumatic.   Right Ear: External ear normal.   Left Ear: External ear normal.   Nose: Nose normal.   Mouth/Throat: Oropharynx is clear and moist and mucous membranes are normal.   Eyes: Conjunctivae are normal.   Neck: Normal range of motion. Neck supple.   Cardiovascular: Normal rate, regular rhythm, normal heart sounds, intact distal pulses and normal pulses. Exam reveals no gallop, no  friction rub and no decreased pulses.    No murmur heard.  Pulmonary/Chest: Effort normal. No respiratory distress. She has wheezes (Bilateral). She has rhonchi (Bilateral). She has rales (Bilateral). She exhibits no tenderness.   Mildly labored breathing noted.   Abdominal: Soft. Normal appearance and bowel sounds are normal. She exhibits no distension and no mass. There is no hepatosplenomegaly. There is no tenderness. There is no rigidity, no rebound and no guarding. No hernia.   Musculoskeletal:        Right lower leg: She exhibits edema (Bilateral lower extremities without erythema or erythematous streaking or pain. Nonpitting.). She exhibits no tenderness, no bony tenderness, no deformity and no laceration.   Neurological: She is alert and oriented to person, place, and time. No cranial nerve deficit. GCS score is 15. GCS eye subscore is 4. GCS verbal subscore is 5. GCS motor subscore is 6.   Skin: Skin is warm and dry. Capillary refill takes less than 2 seconds. No rash noted.   Psychiatric: She has a normal mood and affect.         ED Course   Procedures  Labs Reviewed   POCT GLUCOSE - Abnormal; Notable for the following components:       Result Value    POCT Glucose 173 (*)     All other components within normal limits   POCT CMP - Abnormal; Notable for the following components:    POC Glucose 143 (*)     All other components within normal limits   ISTAT PROCEDURE - Abnormal; Notable for the following components:    POC PH 7.283 (*)     POC PCO2 56.5 (*)     POC PO2 125 (*)     All other components within normal limits   POCT B-TYPE NATRIURETIC PEPTIDE (BNP) - Abnormal; Notable for the following components:    POC B-Type Natriuretic Peptide 286 (*)     All other components within normal limits   CULTURE, BLOOD   CULTURE, BLOOD   TROPONIN ISTAT   POCT CBC   POCT INFLUENZA A/B MOLECULAR   POCT GLUCOSE, HAND-HELD DEVICE   POCT CMP   POCT B-TYPE NATRIURETIC PEPTIDE (BNP)   POCT TROPONIN     EKG Readings:  (Independently Interpreted)   Initial Reading: No STEMI. Rhythm: Normal Sinus Rhythm. Heart Rate: 62. Ectopy: No Ectopy. Conduction: Normal. ST Segments: Normal ST Segments. T Waves: Normal. Axis: Normal. Clinical Impression: Normal Sinus Rhythm       Imaging Results          X-Ray Chest PA And Lateral (Final result)  Result time 01/16/20 15:21:43    Final result by Mikhail Salguero Jr., MD (01/16/20 15:21:43)                 Impression:      There is increase in the interstitial and perihilar markings extending to the right lower lung field.  Congestive heart failure and developing pneumonic infiltrates need to be considered.      Electronically signed by: Mikhail Salguero MD  Date:    01/16/2020  Time:    15:21             Narrative:    EXAMINATION:  XR CHEST PA AND LATERAL    CLINICAL HISTORY:  Cough    TECHNIQUE:  PA and lateral views of the chest were performed.    COMPARISON:  September 2018.    FINDINGS:  Heart is mildly enlarged.  Mild increase in the pulmonary vascular markings.  There is patchy increase in the perihilar interstitial and alveolar markings.  Some increased markings at the right lung base.  Probable resorption distal aspect of the right clavicle similar.  Postop change in the spine.  Epidural stimulator noted.  Compression fracture lower thoracic or upper lumbar spine similar.  No significant effusion.  Bones are osteopenic.                                 Medical Decision Making:   History:   Old Medical Records: I decided to obtain old medical records.  Old Records Summarized: records from another hospital.       <> Summary of Records: Patient has been seen at [facility] this year in which a recent echocardiogram showed an ejection fraction greater than 55% in which the patient has not been diagnosed with congestive heart failure and cleared by her cardiologist.  Initial Assessment:   Right lower lobe pneumonia, chronic lung disease, hyperglycemia secondary to type 2 diabetes and  cough  Differential Diagnosis:   CHF exacerbation, cardiac abnormality, sepsis  Independently Interpreted Test(s):   I have ordered and independently interpreted X-rays - see prior notes.  Clinical Tests:   Lab Tests: Ordered and Reviewed  Radiological Study: Ordered and Reviewed  Medical Tests: Ordered and Reviewed  ED Management:  Patient's cardiac workup was unremarkable. Patient was given Rocephin IV and Levaquin IV for treatment of her pneumonia.  Patient was also given a DuoNeb breathing treatment in the ER.  Patient will be discharged home on Levaquin for treatment of her pneumonia, align probiotic for prevention of antibiotic-associated diarrhea and Tessalon Perles for cough control.  The patient is instructed to continue her home O2 as prescribed, follow up with her primary care provider on January 24, 2020 as scheduled, and return to the ER as needed if symptoms worsen or fail to improve.  Patient's venous blood gas was unremarkable. Patient does not meet sepsis criteria.  CBC was unremarkable.  CMP was unremarkable.  No DVT workup is warranted as the patient has chronic edema in her leg swelling is bilateral not unilateral.  The patient also displays no evidence of chest pain or tachycardia or other symptomatology related to possible DVT or PE.  Patient's Wells criteria does not meet the standard for DVT/PE workup.  The patient does not meet CURB 65 criteria for admission.  The patient verbalized understanding of discharge instructions and treatment plan.            Scribe Attestation:   Scribe #1: I performed the above scribed service and the documentation accurately describes the services I performed. I attest to the accuracy of the note.                          Clinical Impression:     Right lower lobe pneumonia  Chronic lung disease  Hyperglycemia secondary to DM II  Cough                    Toussaint Battley III, [facility]  01/16/20 0246

## 2020-01-21 LAB
BACTERIA BLD CULT: NORMAL
BACTERIA BLD CULT: NORMAL

## 2024-01-18 ENCOUNTER — TELEPHONE (OUTPATIENT)
Dept: TRANSPLANT | Facility: CLINIC | Age: 69
End: 2024-01-18
Payer: MEDICARE

## 2024-01-18 NOTE — TELEPHONE ENCOUNTER
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  I thought it should be sent to Hepatology.  I have scanned the referral and notes into media mgr for your assessment.  Please review and contact the patient to schedule or to advise.      Thank you,        Dahiana Patterson  [facility]

## 2024-01-18 NOTE — TELEPHONE ENCOUNTER
Referral received from Dr Johnathan Renteria       Patient with SIM CIRRHOSIS . MELD 14  ICD-10:  k74.60   Referred for liver transplant for CONSULT .    Referral completed and forwarded to [facility].          Insurance: EPIC

## 2024-01-24 ENCOUNTER — TELEPHONE (OUTPATIENT)
Dept: GASTROENTEROLOGY | Facility: CLINIC | Age: 69
End: 2024-01-24
Payer: MEDICARE

## 2024-01-24 ENCOUNTER — TELEPHONE (OUTPATIENT)
Dept: TRANSPLANT | Facility: CLINIC | Age: 69
End: 2024-01-24
Payer: MEDICARE

## 2024-01-24 NOTE — TELEPHONE ENCOUNTER
----- Message from Quinn Cheng sent at 1/24/2024 12:19 PM CST -----    Called pt to [facility] an appt, sp to pt spouse and he will have the pt call back to [facility].  .

## 2024-01-24 NOTE — TELEPHONE ENCOUNTER
MA returned patient's call.     Mrs. Henao is aware she is being placed on a wait list for a sooner appointment. She will follow up with an hepatology provider to discuss her cirrhosis.

## 2024-01-24 NOTE — TELEPHONE ENCOUNTER
----- Message from Manuel Hernandez sent at 1/24/2024  9:28 AM CST -----  Type:  Sooner Apoointment Request    Caller is requesting a sooner appointment.    Name of Caller:pt   When is the first available appointment?01/31  Symptoms:Constipation, pt also has  Cirrhosis of the liver    Would the patient rather a call back or a response via [employer]? Call   Best Call Back Number: [phone]  Additional Information:

## 2024-02-05 ENCOUNTER — TELEPHONE (OUTPATIENT)
Dept: TRANSPLANT | Facility: CLINIC | Age: 69
End: 2024-02-05
Payer: MEDICARE

## 2024-02-05 NOTE — LETTER
February 5, 2024    Alicja Henao  [address] [place] [address]      Dear Alicja Henao:    Your doctor has referred you to the [facility]. We are sending this letter to remind you to make an appointment with us to complete the referral process.     Please call us at [phone] to schedule an appointment. We look forward to seeing you soon.     If you received a call and have been scheduled, please disregard this letter.       Sincerely,        [facility]   [address] [facility] [place] [address]  [phone]

## 2024-02-05 NOTE — TELEPHONE ENCOUNTER
----- Message from Quinn Cheng sent at 2/5/2024  2:27 PM CST -----    Called and sp and here Sp to [facility] an appt, but they do not [place] [facility]. Explained that the liver txp clinic does not have a [place] location, but they said that they will look for an MD on the [place] outside of [facility]; informed them that they only local txp centers are  and [facility].  .   .

## 2024-02-05 NOTE — TELEPHONE ENCOUNTER
Referral for cirrhosis of liver with meld 14 secondary to SIM  Pt does not want to travel to [facility].   Refused appt with liver txp  Please schedule in hepatology [facility].  Thanks

## 2024-02-05 NOTE — TELEPHONE ENCOUNTER
----- Message from Quinn Cheng sent at 2/5/2024 10:14 AM CST -----    Called pt So to fawad an appt, but they said that they were busy right now. Asked if the pt was interested in [facility] an appt. Pt So said yes, but they were busy bagging groceries. Told them that I will give them a call back later on today.   .

## 2024-02-06 ENCOUNTER — TELEPHONE (OUTPATIENT)
Dept: HEPATOLOGY | Facility: CLINIC | Age: 69
End: 2024-02-06
Payer: MEDICARE

## 2024-02-06 NOTE — TELEPHONE ENCOUNTER
Patient contacted to schedule an appointment from referral.  An appointment have been scheduled with Dr. Dalia Petersen on Monday, March 18, 2024 at 2:30PM.  A copy of the appointment have been mailed out.

## 2024-03-19 ENCOUNTER — TELEPHONE (OUTPATIENT)
Dept: HEPATOLOGY | Facility: CLINIC | Age: 69
End: 2024-03-19
Payer: MEDICARE

## 2024-05-01 ENCOUNTER — OFFICE VISIT (OUTPATIENT)
Dept: GASTROENTEROLOGY | Facility: CLINIC | Age: 69
End: 2024-05-01
Payer: MEDICARE

## 2024-05-01 VITALS
WEIGHT: 194 LBS | SYSTOLIC BLOOD PRESSURE: 155 MMHG | HEART RATE: 87 BPM | DIASTOLIC BLOOD PRESSURE: 83 MMHG | HEIGHT: 61 IN | BODY MASS INDEX: 36.63 KG/M2

## 2024-05-01 DIAGNOSIS — F11.90 CHRONIC NARCOTIC USE: ICD-10-CM

## 2024-05-01 DIAGNOSIS — K74.60 LIVER CIRRHOSIS SECONDARY TO NASH: ICD-10-CM

## 2024-05-01 DIAGNOSIS — R10.13 EPIGASTRIC PAIN: Primary | ICD-10-CM

## 2024-05-01 DIAGNOSIS — K75.81 LIVER CIRRHOSIS SECONDARY TO NASH: ICD-10-CM

## 2024-05-01 PROCEDURE — 1100F PTFALLS ASSESS-DOCD GE2>/YR: CPT | Mod: CPTII,S$GLB,, | Performed by: STUDENT IN AN ORGANIZED HEALTH CARE EDUCATION/TRAINING PROGRAM

## 2024-05-01 PROCEDURE — 3077F SYST BP >= 140 MM HG: CPT | Mod: CPTII,S$GLB,, | Performed by: STUDENT IN AN ORGANIZED HEALTH CARE EDUCATION/TRAINING PROGRAM

## 2024-05-01 PROCEDURE — 3079F DIAST BP 80-89 MM HG: CPT | Mod: CPTII,S$GLB,, | Performed by: STUDENT IN AN ORGANIZED HEALTH CARE EDUCATION/TRAINING PROGRAM

## 2024-05-01 PROCEDURE — 3288F FALL RISK ASSESSMENT DOCD: CPT | Mod: CPTII,S$GLB,, | Performed by: STUDENT IN AN ORGANIZED HEALTH CARE EDUCATION/TRAINING PROGRAM

## 2024-05-01 PROCEDURE — 1159F MED LIST DOCD IN RCRD: CPT | Mod: CPTII,S$GLB,, | Performed by: STUDENT IN AN ORGANIZED HEALTH CARE EDUCATION/TRAINING PROGRAM

## 2024-05-01 PROCEDURE — 99204 OFFICE O/P NEW MOD 45 MIN: CPT | Mod: S$GLB,,, | Performed by: STUDENT IN AN ORGANIZED HEALTH CARE EDUCATION/TRAINING PROGRAM

## 2024-05-01 PROCEDURE — 99999 PR PBB SHADOW E&M-EST. PATIENT-LVL IV: CPT | Mod: PBBFAC,,, | Performed by: STUDENT IN AN ORGANIZED HEALTH CARE EDUCATION/TRAINING PROGRAM

## 2024-05-01 PROCEDURE — 1125F AMNT PAIN NOTED PAIN PRSNT: CPT | Mod: CPTII,S$GLB,, | Performed by: STUDENT IN AN ORGANIZED HEALTH CARE EDUCATION/TRAINING PROGRAM

## 2024-05-01 PROCEDURE — 3008F BODY MASS INDEX DOCD: CPT | Mod: CPTII,S$GLB,, | Performed by: STUDENT IN AN ORGANIZED HEALTH CARE EDUCATION/TRAINING PROGRAM

## 2024-05-01 RX ORDER — CARVEDILOL 3.12 MG/1
3.12 TABLET ORAL DAILY PRN
COMMUNITY

## 2024-05-01 RX ORDER — DULOXETIN HYDROCHLORIDE 60 MG/1
1 CAPSULE, DELAYED RELEASE ORAL DAILY
COMMUNITY
Start: 2024-04-11 | End: 2024-07-10

## 2024-05-01 RX ORDER — INSULIN DEGLUDEC 200 U/ML
25 INJECTION, SOLUTION SUBCUTANEOUS
COMMUNITY
Start: 2024-04-23

## 2024-05-01 RX ORDER — RABEPRAZOLE SODIUM 20 MG/1
20 TABLET, DELAYED RELEASE ORAL DAILY
Qty: 90 TABLET | Refills: 3 | Status: SHIPPED | OUTPATIENT
Start: 2024-05-01

## 2024-05-01 RX ORDER — DICYCLOMINE HYDROCHLORIDE 10 MG/1
10 CAPSULE ORAL
Qty: 90 CAPSULE | Refills: 3 | Status: SHIPPED | OUTPATIENT
Start: 2024-05-01

## 2024-05-01 RX ORDER — RABEPRAZOLE SODIUM 20 MG/1
1 TABLET, DELAYED RELEASE ORAL DAILY
COMMUNITY
End: 2024-05-01 | Stop reason: SDUPTHER

## 2024-05-01 RX ORDER — HYDRALAZINE HYDROCHLORIDE 50 MG/1
TABLET, FILM COATED ORAL
COMMUNITY

## 2024-05-01 RX ORDER — FERROUS GLUCONATE 324(38)MG
324 TABLET ORAL
COMMUNITY

## 2024-05-01 RX ORDER — DULOXETIN HYDROCHLORIDE 30 MG/1
1 CAPSULE, DELAYED RELEASE ORAL DAILY
COMMUNITY
Start: 2024-03-08 | End: 2024-06-06

## 2024-05-01 RX ORDER — FAMOTIDINE 20 MG/1
20 TABLET, FILM COATED ORAL DAILY
Qty: 90 TABLET | Refills: 3 | Status: SHIPPED | OUTPATIENT
Start: 2024-05-01

## 2024-05-01 RX ORDER — FAMOTIDINE 20 MG/1
20 TABLET, FILM COATED ORAL DAILY
COMMUNITY
End: 2024-05-01 | Stop reason: SDUPTHER

## 2024-05-01 RX ORDER — MONTELUKAST SODIUM 10 MG/1
1 TABLET ORAL DAILY
COMMUNITY

## 2024-05-01 RX ORDER — FLUTICASONE PROPIONATE 50 MCG
2 SPRAY, SUSPENSION (ML) NASAL
COMMUNITY
Start: 2023-07-14 | End: 2024-07-13

## 2024-05-01 RX ORDER — ROSUVASTATIN CALCIUM 40 MG/1
1 TABLET, COATED ORAL DAILY
COMMUNITY
Start: 2023-09-01

## 2024-05-01 NOTE — PROGRESS NOTES
Gastroenterology Clinic    Reason for visit: The primary encounter diagnosis was Epigastric pain. 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  Referring Provider/PCP: Enrique Graves MD    History of Present Illness:  Alicja Henao is a 69 y.o. female with a history of SIM cirrhosis, chronic narcotic use, diabetes, GERD, PUD who is presenting for initial evaluation of abdominal pain.    The patient is here to establish care since her  and her switching from [facility].  She has presumed SIM cirrhosis for many years, complicated by hepatic encephalopathy.  She previously saw GI for chronic abdominal pain, records reviewed.  Issues admitted to the hospital in 2022 with acute pancreatitis, also had melena at that time.  EGD reviewed that showed clean based duodenal ulcers.  She was discharged on Pepcid because supposedly she could not take a PPI, but she was switched to rabeprazole eventually.  At the time of the melena episode she was taking Advil which she continues to take now.  She was admitted in November for altered mental status that was thought to be secondary to hepatic encephalopathy as well as drug induced (she is on chronic narcotics and baclofen). During her GI visits there is a mention of achalasia and botox injections at some point but not sure how this was diagnosed.  Her main complaint today is epigastric abdominal pain associated with vomiting.  The pain is constant, worse with eating.  She takes her omeprazole and Pepcid for it in his she does not the pain gets very severe.  She has 1-2 bowel movements a day but they are very hard and she has to strain.  She stopped taking her lactulose because she feels like she did not need it anymore.  She is very forgetful during the visit today but is alert and oriented.  She says she has had memory problems for years now.  Reports several falls recently which worsened her back  pain.      Physical Exam:  Constitutional:  not in acute distress, chronically ill appearing  HENT: Head: Normal, normocephalic, atraumatic.  Eyes: conjunctiva clear and sclera nonicteric  GI: soft, distended, mild tenderness to superficial palpation  Skin: normal color  Neurological: alert, oriented x3  Psychiatric: mood and affect are within normal limits, memory problems    Laboratory:  Lab Results   Component Value Date/Time    HGB  05/22/2020 04:15 PM      Comment:      Duplicate result removed.    This is a corrected result. Previous result was 10.3 gm/dL on 5/22/2020 at 1626 CDT    HGB 8.9 (L) 09/17/2018 08:14 PM    HGB 11.1 (L) 07/07/2018 08:20 AM    AST 16 04/08/2023 10:57 PM    AST 18 09/22/2022 05:32 AM    AST 20 09/17/2018 08:14 PM    AST 30 07/07/2018 08:20 AM    ALT 12 (L) 04/08/2023 10:57 PM    ALT 21 09/22/2022 05:32 AM    ALT 10 09/17/2018 08:14 PM    ALT 20 07/07/2018 08:20 AM    BILITOT 0.6 04/08/2023 10:57 PM    BILITOT 0.4 09/22/2022 05:32 AM    BILITOT 0.3 09/17/2018 08:14 PM    BILITOT 0.6 07/07/2018 08:20 AM     Reviewed.  Outside records with CBC showeing chronci anemia in the 8-9 range, plts 89, LFTs with transaminases 3-4x ULN. Normal T bili    Imaging:  No pertinent imaging.    Endoscopy:  See HPI  Colonoscopy 2019 - records not available, per the patient had 2 polyps    Assessment:  Alicja Henao is a 69 y.o. female who is presenting for initial evaluation of abdominal pain    Problems:  Chronic abdominal pain  Likely a combination of visceral hypersensitivity, chronic narcotic use causing constipation and narcotic bowel.  She has a history of peptic ulcer disease likely secondary to NSAIDs but she has been on rabeprazole for months now.  Will plan for EGD for evaluation since she has had ulcers in the past and is due for variceal screening anyway.  Refilled rabeprazole  Recommended stopping Pepcid and see has she feels  Dicyclomine as needed for abdominal  pain    Constipation  Currently having 1-2 hard bowel movements.  Recommend restarting lactulose  Told her to hold off on restarting the Linzess and escalate lactulose as needed to avoid polypharmacy    Liver cirrhosis  Has an appointment with Dr. Petersen in June.  Will plan for EGD for variceal screening    Chronic narcotic use  Likely contributing to AMS, falls and constipation.  Recommended trying to reduce the dose but she has been on it for year. Will defer management to her pain doctor.      Plan:  Continue rabeprazole 20mg daily  Stop pepcid  Restart lactulose 30mg daily, titrated to 2-3 Bms  EGD for variceal screening, higher than average risk due to DM, BMI    Maria De Jesus Hayes MD  Gastroenterology and Hepatology    No orders of the defined types were placed in this encounter.

## 2024-05-01 NOTE — PATIENT INSTRUCTIONS
Stop taking pepcid and see how your do  We will do an upper endoscopy to evaluate your pain and make sure the ulcers healed  Start taking the lactulose  Take bentyl (dicyclomine) as needed for abdominal pain.

## 2024-06-07 ENCOUNTER — OFFICE VISIT (OUTPATIENT)
Dept: HEPATOLOGY | Facility: CLINIC | Age: 69
End: 2024-06-07
Payer: MEDICARE

## 2024-06-07 DIAGNOSIS — K74.60 HEPATIC CIRRHOSIS, UNSPECIFIED HEPATIC CIRRHOSIS TYPE, UNSPECIFIED WHETHER ASCITES PRESENT: ICD-10-CM

## 2024-06-07 PROCEDURE — 99499 UNLISTED E&M SERVICE: CPT | Mod: 95,,, | Performed by: INTERNAL MEDICINE

## 2024-06-07 NOTE — PROGRESS NOTES
Patient connected to visit >45 minutes late. Patient not seen. Visit was rescheduled for a later date.

## 2024-06-07 NOTE — PROGRESS NOTES
Hepatology Consult Note    Referring provider: Aaareferral Self  PCP: Enrique Graves MD    The patient location is: [place]  The chief complaint leading to consultation is: cirrhosis of liver    Visit type: audiovisual    Face to Face time with patient: ***  *** minutes of total time spent on the encounter, which includes face to face time and non-face to face time preparing to see the patient (eg, review of tests), Obtaining and/or reviewing separately obtained history, Documenting clinical information in the electronic or other health record, Independently interpreting results (not separately reported) and communicating results to the patient/family/caregiver, or Care coordination (not separately reported).     Each patient to whom he or she provides medical services by telemedicine is:  (1) informed of the relationship between the physician and patient and the respective role of any other health care provider with respect to management of the patient; and (2) notified that he or she may decline to receive medical services by telemedicine and may withdraw from such care at any time.      HPI:  Alicja Henao is a 69 y.o. 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 Denies history of DM, HTN, HLD, RAÚL, PCOS***. Denies family history of liver disease or liver cancer.     The patient denies signs/symptoms of decompensated liver disease, including no recent abdominal distension, encephalopathy, jaundice, gross GI bleeding. The patient denies prior evaluation by hepatologist, liver biopsy, paracentesis.    Past Medical History:   Diagnosis Date    Chronic back pain 07/07/2018    Cirrhosis 07/07/2018    Diabetes mellitus        Past  Surgical History:   Procedure Laterality Date    BACK SURGERY         Family History   Problem Relation Name Age of Onset    Esophageal cancer Neg Hx      Colon cancer Neg Hx         Social History     Tobacco Use    Smoking status: Former    Smokeless tobacco: Never   Substance Use Topics    Alcohol use: No    Drug use: No       Current Outpatient Medications   Medication Sig Dispense Refill    aspirin 81 MG Chew Take 81 mg by mouth once daily. (Patient not taking: Reported on 5/1/2024)      baclofen (LIORESAL) 20 MG tablet Take 20 mg by mouth 3 (three) times daily.      benzonatate (TESSALON) 100 MG capsule Take 1 capsule (100 mg total) by mouth every 8 (eight) hours as needed for Cough. (Patient not taking: Reported on 5/1/2024) 20 capsule 0    canagliflozin (INVOKANA) 100 mg Tab Take 100 mg by mouth once daily.      carvediloL (COREG) 3.125 MG tablet Take 3.125 mg by mouth daily as needed (as needed for bp greater than 150). prn      dicyclomine (BENTYL) 10 MG capsule Take 1 capsule (10 mg total) by mouth before meals and at bedtime as needed (for abdominal pain). 90 capsule 3    DULoxetine (CYMBALTA) 60 MG capsule Take 1 capsule by mouth once daily.      ergocalciferol (VITAMIN D2) 50,000 unit Cap Take 50,000 Units by mouth every 7 days.      famotidine (PEPCID) 20 MG tablet Take 1 tablet (20 mg total) by mouth Daily. 90 tablet 3    ferrous gluconate (FERGON) 324 MG tablet Take 324 mg by mouth daily with breakfast.      fluticasone propionate (FLONASE) 50 mcg/actuation nasal spray 2 sprays by Nasal route.      furosemide (LASIX) 40 MG tablet Take 40 mg by mouth 2 (two) times daily. (Patient not taking: Reported on 5/1/2024)      hydrALAZINE (APRESOLINE) 50 MG tablet Take 1 tablet every day by oral route as needed.      HYDROmorphone (DILAUDID) 2 MG tablet Take 1 tablet (2 mg total) by mouth every 4 (four) hours as needed (Breakthrough pain.). (Patient not taking: Reported on 5/1/2024) 8 tablet 0    insulin  "glargine (LANTUS) 100 unit/mL injection Inject into the skin every evening. (Patient not taking: Reported on 5/1/2024)      insulin lispro (HUMALOG) 100 unit/mL injection Inject into the skin 3 (three) times daily before meals. (Patient not taking: Reported on 5/1/2024)      lactulose (CHRONULAC) 10 gram/15 mL solution Take by mouth 3 (three) times daily.      montelukast (SINGULAIR) 10 mg tablet Take 1 tablet by mouth once daily.      RABEprazole (ACIPHEX) 20 mg tablet Take 1 tablet (20 mg total) by mouth once daily. 90 tablet 3    rifAXImin (XIFAXAN) 200 mg Tab Take 550 mg by mouth 3 (three) times daily.      rosuvastatin (CRESTOR) 40 MG Tab Take 1 tablet by mouth once daily.      simvastatin (ZOCOR) 40 MG tablet Take 40 mg by mouth every evening. (Patient not taking: Reported on 5/1/2024)      spironolactone (ALDACTONE) 100 MG tablet Take 100 mg by mouth once daily.      TRESIBA FLEXTOUCH U-200 200 unit/mL (3 mL) insulin pen Inject 25 Units into the skin.       No current facility-administered medications for this visit.       Review of patient's allergies indicates:   Allergen Reactions    Lyrica [pregabalin] Other (See Comments)     "it makes her crazy" per spouse    Mushroom     Protonix [pantoprazole] Other (See Comments)     "it makes her crazy" per spouse.            There were no vitals filed for this visit.    Physical Exam    LABS: I personally reviewed pertinent laboratory findings.    Lab Results   Component Value Date    WBC 5.44 09/17/2018    HGB  05/22/2020      Comment:      Duplicate result removed.    This is a corrected result. Previous result was 10.3 gm/dL on 5/22/2020 at 1626 CDT    HCT  05/22/2020      Comment:      *    This is a corrected result. Previous result was 30.9 % on 5/22/2020 at 1626 CDT    MCV 88 09/17/2018     (L) 09/17/2018       Lab Results   Component Value Date     04/10/2023    K 3.6 04/10/2023     09/17/2018    CO2 27 04/10/2023    BUN 15.3 04/10/2023 " "   CREATININE 1.37 (H) 04/10/2023    CALCIUM 8.5 04/10/2023    ANIONGAP 6 04/10/2023    ESTGFRAFRICA 42 (L) 04/10/2023    EGFRNONAA 49 (A) 09/17/2018       Lab Results   Component Value Date    ALT 12 (L) 04/08/2023    AST 16 04/08/2023    ALKPHOS 102 09/17/2018    BILITOT 0.6 04/08/2023       No results found for: "HAV", "HEPAIGM", "HEPBIGM", "HEPBCAB", "HBEAG", "HEPCAB"    No results found for: "ROBB", "MITOAB", "SMOOTHMUSCAB", "IGG", "CERULOPLSM"     Computed MELD 3.0 unavailable. One or more values for this score either were not found within the given timeframe or did not fit some other criterion.  Computed MELD-Na unavailable. One or more values for this score either were not found within the given timeframe or did not fit some other criterion.       IMAGING: I personally reviewed imaging studies.    Assessment:  69 y.o. female     No diagnosis found.    Recommendations:    Volume:   Infection: No concern at this time***.   Bleeding: History of EVB***. NSBB ***. Last EGD w/o EV Sept 2022 (media tab). Repeat EGD***.  Encephalopathy: Well controlled on current regimen ***. Continue lactulose *** and rifaximin 550mg BID***.  Screening: Last AFP ***. Last liver US ***.  Immunization: HAV ***. HBV ***.  Transplant:      Return to clinic in {NUMBERS:20191} months.    I have sent communication to the referring physician and/or primary care provider.    Dalia Petersen MD  Staff Physician  Hepatology and Liver Transplant  [facility]  [facility]      "

## 2024-06-18 ENCOUNTER — TELEPHONE (OUTPATIENT)
Dept: ENDOSCOPY | Facility: HOSPITAL | Age: 69
End: 2024-06-18
Payer: MEDICARE

## 2024-06-18 VITALS — WEIGHT: 194 LBS | BODY MASS INDEX: 36.63 KG/M2 | HEIGHT: 61 IN

## 2024-06-18 DIAGNOSIS — R10.9 ABDOMINAL PAIN, UNSPECIFIED ABDOMINAL LOCATION: Primary | ICD-10-CM

## 2024-06-18 DIAGNOSIS — K74.60 HEPATIC CIRRHOSIS, UNSPECIFIED HEPATIC CIRRHOSIS TYPE, UNSPECIFIED WHETHER ASCITES PRESENT: ICD-10-CM

## 2024-06-18 DIAGNOSIS — K22.89 ESOPHAGEAL BLEED, NON-VARICEAL: ICD-10-CM

## 2024-06-18 DIAGNOSIS — Z01.812 PRE-PROCEDURE LAB EXAM: ICD-10-CM

## 2024-06-18 NOTE — TELEPHONE ENCOUNTER
Spoke to patient to schedule procedure(s) Upper Endoscopy (EGD)       Physician to perform procedure(s) Dr. SANTOS Hayes  Date of Procedure (s) 8/5/2024  Arrival Time 11:30 AM   Time of Procedure(s) 12:30 PM    Location of Procedure(s) [facility] 2nd Floor--Enter at the rear of the building through the emergency department screening station or the Outpatient Registration door, then continue to endoscopy department on the 2nd floor.    Type of Rx Prep sent to patient: N/A  Instructions provided to patient via [employer]/mailed to home     Patient was informed on the following information and verbalized understanding. Screening questionnaire reviewed with patient and complete. If procedure requires anesthesia, a responsible adult needs to be present to accompany the patient home, patient cannot drive after receiving anesthesia. Appointment details are tentative, especially check-in time. Patient will receive a prep-op call 7 days prior to confirm check-in time for procedure. If applicable the patient should contact their pharmacy to verify Rx for procedure prep is ready for pick-up. Patient was advised to call the scheduling department at [phone] if pharmacy states no Rx is available. Patient was advised to call the endoscopy scheduling department if any questions or concerns arise.       Endoscopy Scheduling Department

## 2024-06-18 NOTE — TELEPHONE ENCOUNTER
"----- Message from Zeinab Corral sent at 2024  3:47 PM CDT -----    ----- Message -----  From: Maria De Jesus Hayes MD  Sent: 2024   9:41 AM CDT  To: [facility] Patients    Procedure: EGD    Diagnosis: Abdominal pain and variceal screening    Procedure Timin-12 weeks    #If within 4 weeks selected, please quinten as high priority#    #If greater than 12 weeks, please select "5-12 weeks" and delay sending until 3 months prior to requested date#     Location:  Endo    Additional Scheduling Information: Cirrhosis, Diabetes    Prep Specifications:Standard prep    Is the patient taking a GLP-1 Agonist:no    Have you attached a patient to this message: yes  "

## 2024-06-22 ENCOUNTER — TELEPHONE (OUTPATIENT)
Dept: ENDOSCOPY | Facility: HOSPITAL | Age: 69
End: 2024-06-22
Payer: MEDICARE

## 2024-06-22 NOTE — TELEPHONE ENCOUNTER
----- Message from Harish Skinner MA sent at 2024  4:36 PM CDT -----  Regardin/5 CL  The patient is currently under an external cardiologist Dr. Jean Marie parry and requires a clearance Cardiology for their upcoming scheduled Upper Endoscopy (EGD) on 2024.       External provider information:    Physician name: Dr. Aj   Facility/Location: [facility]  Phone number: [phone]    Notes:

## 2024-06-28 ENCOUNTER — OFFICE VISIT (OUTPATIENT)
Dept: TRANSPLANT | Facility: CLINIC | Age: 69
End: 2024-06-28
Payer: MEDICARE

## 2024-06-28 VITALS
SYSTOLIC BLOOD PRESSURE: 171 MMHG | DIASTOLIC BLOOD PRESSURE: 71 MMHG | OXYGEN SATURATION: 97 % | WEIGHT: 204.38 LBS | HEART RATE: 69 BPM | BODY MASS INDEX: 37.61 KG/M2 | HEIGHT: 62 IN | TEMPERATURE: 97 F | RESPIRATION RATE: 18 BRPM

## 2024-06-28 DIAGNOSIS — K76.6 PORTAL HYPERTENSION: ICD-10-CM

## 2024-06-28 DIAGNOSIS — E11.69 TYPE 2 DIABETES MELLITUS WITH OTHER SPECIFIED COMPLICATION, WITH LONG-TERM CURRENT USE OF INSULIN: ICD-10-CM

## 2024-06-28 DIAGNOSIS — D69.6 THROMBOCYTOPENIA, UNSPECIFIED: ICD-10-CM

## 2024-06-28 DIAGNOSIS — K75.81 METABOLIC DYSFUNCTION-ASSOCIATED STEATOHEPATITIS (MASH): ICD-10-CM

## 2024-06-28 DIAGNOSIS — Z79.4 TYPE 2 DIABETES MELLITUS WITH OTHER SPECIFIED COMPLICATION, WITH LONG-TERM CURRENT USE OF INSULIN: ICD-10-CM

## 2024-06-28 DIAGNOSIS — K74.69 DECOMPENSATED LIVER DISEASE: ICD-10-CM

## 2024-06-28 DIAGNOSIS — E66.01 CLASS 2 SEVERE OBESITY WITH SERIOUS COMORBIDITY AND BODY MASS INDEX (BMI) OF 37.0 TO 37.9 IN ADULT, UNSPECIFIED OBESITY TYPE: ICD-10-CM

## 2024-06-28 DIAGNOSIS — K72.91 HEPATIC COMA/ENCEPHALOPATHY: ICD-10-CM

## 2024-06-28 PROBLEM — K72.90 DECOMPENSATED LIVER DISEASE: Status: ACTIVE | Noted: 2024-06-28

## 2024-06-28 PROBLEM — E11.9 TYPE 2 DIABETES MELLITUS, WITH LONG-TERM CURRENT USE OF INSULIN: Status: ACTIVE | Noted: 2024-06-28

## 2024-06-28 PROBLEM — E66.812 CLASS 2 SEVERE OBESITY WITH SERIOUS COMORBIDITY AND BODY MASS INDEX (BMI) OF 37.0 TO 37.9 IN ADULT: Status: ACTIVE | Noted: 2024-06-28

## 2024-06-28 PROBLEM — K74.60 DECOMPENSATED LIVER DISEASE: Status: ACTIVE | Noted: 2024-06-28

## 2024-06-28 PROCEDURE — 99999 PR PBB SHADOW E&M-EST. PATIENT-LVL V: CPT | Mod: PBBFAC,,, | Performed by: INTERNAL MEDICINE

## 2024-06-28 RX ORDER — DAPAGLIFLOZIN 10 MG/1
1 TABLET, FILM COATED ORAL DAILY
COMMUNITY
Start: 2024-04-23

## 2024-06-28 RX ORDER — ONDANSETRON 4 MG/1
TABLET, FILM COATED ORAL
COMMUNITY

## 2024-06-28 NOTE — LETTER
June 28, 2024        Johnathan Renteria  [address] [place] [address]   [address]  [place] [address]  Phone: [phone]  Fax: [phone]             [facility]  [address]  [facility] [address]  Phone: [phone]   Patient: Alicja Henao   MR Number: [id number]   YOB: 1955   Date of Visit: 6/28/2024       Dear Dr. Johnathan Renteria    Thank you for referring Alicja Henao to me for evaluation. Attached you will find relevant portions of my assessment and plan of care.    If you have questions, please do not hesitate to call me. I look forward to following Alicja Henao along with you.    Sincerely,    Dalia Petersen MD    Enclosure    If you would like to receive this communication electronically, please contact [email or web address] or [phone] to request [employer] Link access.    [employer] Link is a tool which provides read-only access to select patient information with whom you have a relationship. Its easy to use and provides real time access to review your patients record including encounter summaries, notes, results, and demographic information.    If you feel you have received this communication in error or would no longer like to receive these types of communications, please e-mail [email or web address]

## 2024-06-28 NOTE — PROGRESS NOTES
Hepatology Consult Note    Referring provider: Aaareferral Self  PCP: Enrique Graves MD    Chief complaint: MASH cirrhosis     HPI:  Alicja Henao is a 69 y.o. 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 Accompanied by , Lloyd. First diagnosed with cirrhosis about 5 years ago.     Regarding risk factors for liver disease, the patient denies prior history of EtOH abuse, prior intravenous drug use, blood transfusions, prior tattoos. MASLD risk factors: obesity (heaviest weight 240lb in 1980s), DM type 2, HLD, HTN, RAÚL. Denies family history of liver disease or liver cancer.     Regarding decompensating events, she has been diagnosed with hepatic encephalopathy for which she takes lactulose and rifaximin. She used to be of diuretics, but no longer on them because of kidney dysfunction. Denies prior paracentesis. Reports prior diagnosis of esophageal varices, but does not think she has bled from them.    The patient denies prior episodes of jaundice.    Denies upper abdominal surgeries. Reports prior appendectomy.     Past Medical History:   Diagnosis Date    Chronic back pain 07/07/2018    Cirrhosis 07/07/2018    Diabetes mellitus        Past Surgical History:   Procedure Laterality Date    BACK SURGERY         Family History   Problem Relation Name Age of Onset    Esophageal cancer Neg Hx      Colon cancer Neg Hx         Social History     Tobacco Use    Smoking status: Former    Smokeless tobacco: Never   Substance Use Topics    Alcohol use: No    Drug use: No       Current Outpatient Medications   Medication Sig Dispense Refill    baclofen (LIORESAL) 20 MG tablet Take 20 mg by mouth 3 (three) times daily.      carvediloL (COREG) 3.125 MG tablet Take 3.125 mg by mouth daily as needed (as needed  for bp greater than 150). prn      dapagliflozin propanediol (FARXIGA) 10 mg tablet Take 1 tablet by mouth once daily.      dicyclomine (BENTYL) 10 MG capsule Take 1 capsule (10 mg total) by mouth before meals and at bedtime as needed (for abdominal pain). 90 capsule 3    DULoxetine (CYMBALTA) 60 MG capsule Take 1 capsule by mouth once daily.      ergocalciferol (VITAMIN D2) 50,000 unit Cap Take 50,000 Units by mouth every 7 days.      ferrous gluconate (FERGON) 324 MG tablet Take 324 mg by mouth daily with breakfast.      hydrALAZINE (APRESOLINE) 50 MG tablet Take 1 tablet every day by oral route as needed.      lactulose (CHRONULAC) 10 gram/15 mL solution Take by mouth 3 (three) times daily.      montelukast (SINGULAIR) 10 mg tablet Take 1 tablet by mouth once daily.      ondansetron (ZOFRAN) 4 MG tablet Take by mouth.      RABEprazole (ACIPHEX) 20 mg tablet Take 1 tablet (20 mg total) by mouth once daily. 90 tablet 3    rifAXImin (XIFAXAN) 200 mg Tab Take 550 mg by mouth 3 (three) times daily.      rosuvastatin (CRESTOR) 40 MG Tab Take 1 tablet by mouth once daily.      TRESIBA FLEXTOUCH U-200 200 unit/mL (3 mL) insulin pen Inject 25 Units into the skin.      aspirin 81 MG Chew Take 81 mg by mouth once daily. (Patient not taking: Reported on 5/1/2024)      benzonatate (TESSALON) 100 MG capsule Take 1 capsule (100 mg total) by mouth every 8 (eight) hours as needed for Cough. (Patient not taking: Reported on 5/1/2024) 20 capsule 0    canagliflozin (INVOKANA) 100 mg Tab Take 100 mg by mouth once daily.      famotidine (PEPCID) 20 MG tablet Take 1 tablet (20 mg total) by mouth Daily. (Patient not taking: Reported on 6/28/2024) 90 tablet 3    fluticasone propionate (FLONASE) 50 mcg/actuation nasal spray 2 sprays by Nasal route. (Patient not taking: Reported on 6/28/2024)      furosemide (LASIX) 40 MG tablet Take 40 mg by mouth 2 (two) times daily. (Patient not taking: Reported on 5/1/2024)      HYDROmorphone  "(DILAUDID) 2 MG tablet Take 1 tablet (2 mg total) by mouth every 4 (four) hours as needed (Breakthrough pain.). (Patient not taking: Reported on 5/1/2024) 8 tablet 0    insulin glargine (LANTUS) 100 unit/mL injection Inject into the skin every evening. (Patient not taking: Reported on 5/1/2024)      insulin lispro (HUMALOG) 100 unit/mL injection Inject into the skin 3 (three) times daily before meals. (Patient not taking: Reported on 5/1/2024)      simvastatin (ZOCOR) 40 MG tablet Take 40 mg by mouth every evening. (Patient not taking: Reported on 5/1/2024)      spironolactone (ALDACTONE) 100 MG tablet Take 100 mg by mouth once daily.       No current facility-administered medications for this visit.       Review of patient's allergies indicates:   Allergen Reactions    Lyrica [pregabalin] Other (See Comments)     "it makes her crazy" per spouse    Mushroom     Protonix [pantoprazole] Other (See Comments)     "it makes her crazy" per spouse.            Vitals:    06/28/24 0944   BP: (!) 171/71   Pulse: 69   Resp: 18   Temp: 97.3 °F (36.3 °C)   TempSrc: Temporal   SpO2: 97%   Weight: 92.7 kg (204 lb 5.9 oz)   Height: 5' 2" (1.575 m)   Body mass index is 37.38 kg/m².    Physical Exam  Constitutional:       General: She is not in acute distress.     Appearance: She is obese.      Comments: On wheelchair.   Eyes:      General: No scleral icterus.  Cardiovascular:      Rate and Rhythm: Normal rate.   Pulmonary:      Effort: Pulmonary effort is normal.   Abdominal:      General: Abdomen is protuberant. There is no distension.      Palpations: There is no fluid wave.      Tenderness: There is abdominal tenderness in the periumbilical area.      Comments: Unable to palpate for hepatosplenomegaly due to body habitus.   Musculoskeletal:      Right lower leg: No swelling.      Left lower leg: No swelling.      Comments: Both legs wrapped in elastic bandage wrap up to below the knees.   Neurological:      Mental Status: She is " "alert.         LABS: I personally reviewed pertinent laboratory findings.    Lab Results   Component Value Date    WBC 5.44 09/17/2018    HGB  05/22/2020      Comment:      Duplicate result removed.    This is a corrected result. Previous result was 10.3 gm/dL on 5/22/2020 at 1626 CDT    HCT  05/22/2020      Comment:      *    This is a corrected result. Previous result was 30.9 % on 5/22/2020 at 1626 CDT    MCV 88 09/17/2018     (L) 09/17/2018       Lab Results   Component Value Date     04/10/2023    K 3.6 04/10/2023     09/17/2018    CO2 27 04/10/2023    BUN 15.3 04/10/2023    CREATININE 1.37 (H) 04/10/2023    CALCIUM 8.5 04/10/2023    ANIONGAP 6 04/10/2023    ESTGFRAFRICA 42 (L) 04/10/2023    EGFRNONAA 49 (A) 09/17/2018       Lab Results   Component Value Date    ALT 12 (L) 04/08/2023    AST 16 04/08/2023    ALKPHOS 102 09/17/2018    BILITOT 0.6 04/08/2023       No results found for: "HAV", "HEPAIGM", "HEPBIGM", "HEPBCAB", "HBEAG", "HEPCAB"    No results found for: "ROBB", "MITOAB", "SMOOTHMUSCAB", "IGG", "CERULOPLSM"     Computed MELD 3.0 unavailable. One or more values for this score either were not found within the given timeframe or did not fit some other criterion.  Computed MELD-Na unavailable. One or more values for this score either were not found within the given timeframe or did not fit some other criterion.       IMAGING: I personally reviewed imaging studies.    CT A.P with contrast 7/1/2023 (Care Everywhere)  Findings:  The visualized lung bases are clear.      Abdomen:  The liver is cirrhotic. There are gallstones present without acute inflammation.     The pancreas, spleen, adrenal glands, kidneys, stomach and abdominal loops of bowel appear normal for noncontrast technique. No free fluid or adenopathy.      Pelvis:  The colon, pelvic loops of small bowel, and urinary bladder appear normal. No free fluid or adenopathy.      No acute osseous abnormality is seen.     Assessment:  " Alicja Henao is a 69 y.o. 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     1. Decompensated liver disease    2. Metabolic dysfunction-associated steatohepatitis (MASH)    3. Hepatic coma/encephalopathy    4. Portal hypertension    5. Thrombocytopenia, unspecified    6. Class 2 severe obesity with serious comorbidity and body mass index (BMI) of 37.0 to 37.9 in adult, unspecified obesity type    7. Type 2 diabetes mellitus with other specified complication, with long-term current use of insulin      #Decompensated Cirrhosis: Likely secondary to MASH. Plan to complete chronic liver disease workup.    Volume: Minimal to no ascites, no LE edema. Not of diuretics. Denies prior paracentesis. Na 2g diet.  Infection: No concern at this time.   Bleeding: Timing of last EGD unknown. Scheduled for EGD on 8/5/24.  Encephalopathy: Continue lactulose, titrate to 3-4 BM/day, and rifaximin 550mg BID. Limit sedating medications / narcotics.  Screening: Due now. AFP and liver US q6 months.  Immunization: f/u HAV, HBV serologies.  Transplant: Low MELD. Hepatic decompensations are well-controlled with medical therapy.    Return to clinic in 3 months.    I have sent communication to the referring physician and/or primary care provider.    Dalia Petersen MD  Staff Physician  Hepatology and Liver Transplant  [facility]  [facility]

## 2024-07-29 ENCOUNTER — TELEPHONE (OUTPATIENT)
Dept: ENDOSCOPY | Facility: HOSPITAL | Age: 69
End: 2024-07-29
Payer: MEDICARE

## 2024-07-29 NOTE — TELEPHONE ENCOUNTER
Spoke to patient's  for pre-call to confirm scheduled Upper Endoscopy (EGD) and patient verbalized understanding of the following:       Date & arrival time of procedure(s) verified 8/5/24, 11:30 AM.  Location of procedure(s) [address]  verified.  NPO status reinforced. Ok to continue clear liquids until 8:30 AM.   Patient's  confirmed receipt of prep instructions.  Instructions provided to patient via [employer].  Patient's  confirmed ride home after procedure if procedure requires anesthesia.   Pre-call screening questionnaire reviewed and completed with patient's  .   Appointment details are tentative, including check-in time.  If the patient begins taking any blood thinning medications, injectable weight loss/diabetes medications (other than insulin), or Adipex (phentermine) patient's  was instructed to contact the endoscopy scheduling department as soon as possible.  Patient's  was advised to call the endoscopy scheduling department if any questions or concerns arise.      Endoscopy Scheduling Department

## 2024-08-02 ENCOUNTER — LAB VISIT (OUTPATIENT)
Dept: LAB | Facility: HOSPITAL | Age: 69
End: 2024-08-02
Attending: STUDENT IN AN ORGANIZED HEALTH CARE EDUCATION/TRAINING PROGRAM
Payer: MEDICARE

## 2024-08-02 ENCOUNTER — TELEPHONE (OUTPATIENT)
Dept: ENDOSCOPY | Facility: HOSPITAL | Age: 69
End: 2024-08-02
Payer: MEDICARE

## 2024-08-02 DIAGNOSIS — Z01.812 PRE-PROCEDURE LAB EXAM: ICD-10-CM

## 2024-08-02 DIAGNOSIS — K74.60 HEPATIC CIRRHOSIS, UNSPECIFIED HEPATIC CIRRHOSIS TYPE, UNSPECIFIED WHETHER ASCITES PRESENT: ICD-10-CM

## 2024-08-02 LAB
ABO + RH BLD: NORMAL
BLD GP AB SCN CELLS X3 SERPL QL: NORMAL
ERYTHROCYTE [DISTWIDTH] IN BLOOD BY AUTOMATED COUNT: 13.3 % (ref 11.5–14.5)
HCT VFR BLD AUTO: 31 % (ref 37–48.5)
HGB BLD-MCNC: 9.5 G/DL (ref 12–16)
INR PPP: 1 (ref 0.8–1.2)
MCH RBC QN AUTO: 28.6 PG (ref 27–31)
MCHC RBC AUTO-ENTMCNC: 30.6 G/DL (ref 32–36)
MCV RBC AUTO: 93 FL (ref 82–98)
PLATELET # BLD AUTO: 114 K/UL (ref 150–450)
PMV BLD AUTO: 11.9 FL (ref 9.2–12.9)
PROTHROMBIN TIME: 11.2 SEC (ref 9–12.5)
RBC # BLD AUTO: 3.32 M/UL (ref 4–5.4)
WBC # BLD AUTO: 4.8 K/UL (ref 3.9–12.7)

## 2024-08-02 PROCEDURE — 86850 RBC ANTIBODY SCREEN: CPT | Performed by: STUDENT IN AN ORGANIZED HEALTH CARE EDUCATION/TRAINING PROGRAM

## 2024-08-02 PROCEDURE — 85610 PROTHROMBIN TIME: CPT | Performed by: STUDENT IN AN ORGANIZED HEALTH CARE EDUCATION/TRAINING PROGRAM

## 2024-08-02 PROCEDURE — 85027 COMPLETE CBC AUTOMATED: CPT | Performed by: STUDENT IN AN ORGANIZED HEALTH CARE EDUCATION/TRAINING PROGRAM

## 2024-08-02 PROCEDURE — 86900 BLOOD TYPING SEROLOGIC ABO: CPT | Performed by: STUDENT IN AN ORGANIZED HEALTH CARE EDUCATION/TRAINING PROGRAM

## 2024-08-02 PROCEDURE — 86901 BLOOD TYPING SEROLOGIC RH(D): CPT | Performed by: STUDENT IN AN ORGANIZED HEALTH CARE EDUCATION/TRAINING PROGRAM

## 2024-08-02 PROCEDURE — 36415 COLL VENOUS BLD VENIPUNCTURE: CPT | Mod: PO | Performed by: STUDENT IN AN ORGANIZED HEALTH CARE EDUCATION/TRAINING PROGRAM

## 2024-08-02 NOTE — TELEPHONE ENCOUNTER
"----- Message from Zeinab Corral sent at 8/2/2024  9:02 AM CDT -----  Regarding: FW: Lloyd " "    ----- Message -----  From: Joelle Martinez MA  Sent: 8/2/2024   8:53 AM CDT  To: [facility]  Subject: FW: Lloyd " "                                ----- Message -----  From: Cecil Cardona  Sent: 8/2/2024   8:50 AM CDT  To: [facility] Staff  Subject: Lloyd " "                                                                                          Reschedule Appointment     Patient Name: Alicja Beltran       Original Date Of Appt: 08/05      Preferred Date: Would like a sooner appointment for the endoscopy. Please call if possible      Contact Info: .[phone]        Additional Info:  "

## 2024-08-02 NOTE — TELEPHONE ENCOUNTER
Return call and spoke with Mr. Desai. Per Mr. Desai decline to reschedule for a earlier time stating will keep the original time.

## 2024-08-04 ENCOUNTER — ANESTHESIA EVENT (OUTPATIENT)
Dept: ENDOSCOPY | Facility: HOSPITAL | Age: 69
End: 2024-08-04
Payer: MEDICARE

## 2024-08-04 RX ORDER — LIDOCAINE HYDROCHLORIDE 10 MG/ML
1 INJECTION, SOLUTION EPIDURAL; INFILTRATION; INTRACAUDAL; PERINEURAL ONCE
OUTPATIENT
Start: 2024-08-04 | End: 2024-08-04

## 2024-08-05 ENCOUNTER — NURSE TRIAGE (OUTPATIENT)
Dept: ADMINISTRATIVE | Facility: CLINIC | Age: 69
End: 2024-08-05
Payer: MEDICARE

## 2024-08-05 ENCOUNTER — HOSPITAL ENCOUNTER (OUTPATIENT)
Facility: HOSPITAL | Age: 69
Discharge: HOME | End: 2024-08-05
Attending: STUDENT IN AN ORGANIZED HEALTH CARE EDUCATION/TRAINING PROGRAM | Admitting: STUDENT IN AN ORGANIZED HEALTH CARE EDUCATION/TRAINING PROGRAM
Payer: MEDICARE

## 2024-08-05 ENCOUNTER — ANESTHESIA (OUTPATIENT)
Dept: ENDOSCOPY | Facility: HOSPITAL | Age: 69
End: 2024-08-05
Payer: MEDICARE

## 2024-08-05 VITALS
OXYGEN SATURATION: 94 % | DIASTOLIC BLOOD PRESSURE: 72 MMHG | SYSTOLIC BLOOD PRESSURE: 168 MMHG | RESPIRATION RATE: 18 BRPM | TEMPERATURE: 98 F | HEART RATE: 62 BPM

## 2024-08-05 DIAGNOSIS — I85.00 ESOPHAGEAL VARICES: ICD-10-CM

## 2024-08-05 LAB — POCT GLUCOSE: 140 MG/DL (ref 70–110)

## 2024-08-05 PROCEDURE — 25000003 PHARM REV CODE 250: Performed by: STUDENT IN AN ORGANIZED HEALTH CARE EDUCATION/TRAINING PROGRAM

## 2024-08-05 PROCEDURE — 88342 IMHCHEM/IMCYTCHM 1ST ANTB: CPT | Performed by: PATHOLOGY

## 2024-08-05 PROCEDURE — 88305 TISSUE EXAM BY PATHOLOGIST: CPT | Performed by: PATHOLOGY

## 2024-08-05 PROCEDURE — 25000003 PHARM REV CODE 250: Performed by: ANESTHESIOLOGY

## 2024-08-05 PROCEDURE — 27201012 HC FORCEPS, HOT/COLD, DISP: Performed by: STUDENT IN AN ORGANIZED HEALTH CARE EDUCATION/TRAINING PROGRAM

## 2024-08-05 PROCEDURE — 37000009 HC ANESTHESIA EA ADD 15 MINS: Performed by: STUDENT IN AN ORGANIZED HEALTH CARE EDUCATION/TRAINING PROGRAM

## 2024-08-05 PROCEDURE — 43239 EGD BIOPSY SINGLE/MULTIPLE: CPT | Mod: ,,, | Performed by: STUDENT IN AN ORGANIZED HEALTH CARE EDUCATION/TRAINING PROGRAM

## 2024-08-05 PROCEDURE — 43239 EGD BIOPSY SINGLE/MULTIPLE: CPT | Performed by: STUDENT IN AN ORGANIZED HEALTH CARE EDUCATION/TRAINING PROGRAM

## 2024-08-05 PROCEDURE — 63600175 PHARM REV CODE 636 W HCPCS: Performed by: STUDENT IN AN ORGANIZED HEALTH CARE EDUCATION/TRAINING PROGRAM

## 2024-08-05 PROCEDURE — 37000008 HC ANESTHESIA 1ST 15 MINUTES: Performed by: STUDENT IN AN ORGANIZED HEALTH CARE EDUCATION/TRAINING PROGRAM

## 2024-08-05 RX ORDER — PROPOFOL 10 MG/ML
VIAL (ML) INTRAVENOUS
Status: DISCONTINUED
Start: 2024-08-05 | End: 2024-08-05 | Stop reason: HOSPADM

## 2024-08-05 RX ORDER — PROPOFOL 10 MG/ML
VIAL (ML) INTRAVENOUS
Status: DISCONTINUED | OUTPATIENT
Start: 2024-08-05 | End: 2024-08-05

## 2024-08-05 RX ORDER — LIDOCAINE HYDROCHLORIDE 20 MG/ML
INJECTION INTRAVENOUS
Status: DISCONTINUED | OUTPATIENT
Start: 2024-08-05 | End: 2024-08-05

## 2024-08-05 RX ORDER — SODIUM CHLORIDE 9 MG/ML
INJECTION, SOLUTION INTRAVENOUS CONTINUOUS
Status: DISCONTINUED | OUTPATIENT
Start: 2024-08-05 | End: 2024-08-05 | Stop reason: HOSPADM

## 2024-08-05 RX ORDER — LIDOCAINE HYDROCHLORIDE 20 MG/ML
INJECTION, SOLUTION EPIDURAL; INFILTRATION; INTRACAUDAL; PERINEURAL
Status: DISCONTINUED
Start: 2024-08-05 | End: 2024-08-05 | Stop reason: HOSPADM

## 2024-08-05 RX ORDER — LIDOCAINE HYDROCHLORIDE 40 MG/ML
SOLUTION TOPICAL
Status: DISCONTINUED | OUTPATIENT
Start: 2024-08-05 | End: 2024-08-05

## 2024-08-05 RX ADMIN — LIDOCAINE HYDROCHLORIDE 100 MG: 20 INJECTION, SOLUTION INTRAVENOUS at 12:08

## 2024-08-05 RX ADMIN — PROPOFOL 60 MG: 10 INJECTION, EMULSION INTRAVENOUS at 12:08

## 2024-08-05 RX ADMIN — LIDOCAINE HYDROCHLORIDE 4 ML: 40 SOLUTION TOPICAL at 12:08

## 2024-08-05 RX ADMIN — SODIUM CHLORIDE: 0.9 INJECTION, SOLUTION INTRAVENOUS at 12:08

## 2024-08-06 ENCOUNTER — TELEPHONE (OUTPATIENT)
Dept: ENDOSCOPY | Facility: HOSPITAL | Age: 69
End: 2024-08-06
Payer: MEDICARE

## 2024-08-09 LAB
COMMENT: NORMAL
FINAL PATHOLOGIC DIAGNOSIS: NORMAL
GROSS: NORMAL
Lab: NORMAL

## 2024-09-30 ENCOUNTER — HOSPITAL ENCOUNTER (OUTPATIENT)
Dept: RADIOLOGY | Facility: HOSPITAL | Age: 69
Discharge: HOME | End: 2024-09-30
Attending: INTERNAL MEDICINE
Payer: MEDICARE

## 2024-09-30 DIAGNOSIS — K74.69 DECOMPENSATED LIVER DISEASE: ICD-10-CM

## 2024-09-30 PROCEDURE — 76700 US EXAM ABDOM COMPLETE: CPT | Mod: 26,,, | Performed by: RADIOLOGY

## 2024-09-30 PROCEDURE — 76700 US EXAM ABDOM COMPLETE: CPT | Mod: TC

## 2024-10-07 ENCOUNTER — OFFICE VISIT (OUTPATIENT)
Dept: HEPATOLOGY | Facility: CLINIC | Age: 69
End: 2024-10-07
Payer: MEDICARE

## 2024-10-07 VITALS
DIASTOLIC BLOOD PRESSURE: 73 MMHG | SYSTOLIC BLOOD PRESSURE: 143 MMHG | WEIGHT: 197.44 LBS | HEART RATE: 79 BPM | BODY MASS INDEX: 37.28 KG/M2 | HEIGHT: 61 IN

## 2024-10-07 DIAGNOSIS — K74.69 DECOMPENSATED LIVER DISEASE: ICD-10-CM

## 2024-10-07 DIAGNOSIS — R76.8 HEPATITIS B CORE ANTIBODY POSITIVE: ICD-10-CM

## 2024-10-07 DIAGNOSIS — Z78.9 IMMUNE TO HEPATITIS B: ICD-10-CM

## 2024-10-07 DIAGNOSIS — K72.91 HEPATIC COMA/ENCEPHALOPATHY: ICD-10-CM

## 2024-10-07 DIAGNOSIS — E66.01 CLASS 2 SEVERE OBESITY DUE TO EXCESS CALORIES WITH SERIOUS COMORBIDITY AND BODY MASS INDEX (BMI) OF 37.0 TO 37.9 IN ADULT: ICD-10-CM

## 2024-10-07 DIAGNOSIS — D69.6 THROMBOCYTOPENIA, UNSPECIFIED: ICD-10-CM

## 2024-10-07 DIAGNOSIS — K75.81 METABOLIC DYSFUNCTION-ASSOCIATED STEATOHEPATITIS (MASH): ICD-10-CM

## 2024-10-07 DIAGNOSIS — E66.812 CLASS 2 SEVERE OBESITY DUE TO EXCESS CALORIES WITH SERIOUS COMORBIDITY AND BODY MASS INDEX (BMI) OF 37.0 TO 37.9 IN ADULT: ICD-10-CM

## 2024-10-07 DIAGNOSIS — Z78.9 IMMUNE TO HEPATITIS A: ICD-10-CM

## 2024-10-07 DIAGNOSIS — K74.60 CIRRHOSIS OF LIVER WITHOUT ASCITES, UNSPECIFIED HEPATIC CIRRHOSIS TYPE: ICD-10-CM

## 2024-10-07 DIAGNOSIS — K76.6 PORTAL HYPERTENSION: ICD-10-CM

## 2024-10-07 PROCEDURE — 1159F MED LIST DOCD IN RCRD: CPT | Mod: CPTII,S$GLB,, | Performed by: INTERNAL MEDICINE

## 2024-10-07 PROCEDURE — 1100F PTFALLS ASSESS-DOCD GE2>/YR: CPT | Mod: CPTII,S$GLB,, | Performed by: INTERNAL MEDICINE

## 2024-10-07 PROCEDURE — 3288F FALL RISK ASSESSMENT DOCD: CPT | Mod: CPTII,S$GLB,, | Performed by: INTERNAL MEDICINE

## 2024-10-07 PROCEDURE — 1125F AMNT PAIN NOTED PAIN PRSNT: CPT | Mod: CPTII,S$GLB,, | Performed by: INTERNAL MEDICINE

## 2024-10-07 PROCEDURE — 3077F SYST BP >= 140 MM HG: CPT | Mod: CPTII,S$GLB,, | Performed by: INTERNAL MEDICINE

## 2024-10-07 PROCEDURE — 99214 OFFICE O/P EST MOD 30 MIN: CPT | Mod: S$GLB,,, | Performed by: INTERNAL MEDICINE

## 2024-10-07 PROCEDURE — 99999 PR PBB SHADOW E&M-EST. PATIENT-LVL III: CPT | Mod: PBBFAC,,, | Performed by: INTERNAL MEDICINE

## 2024-10-07 PROCEDURE — 1160F RVW MEDS BY RX/DR IN RCRD: CPT | Mod: CPTII,S$GLB,, | Performed by: INTERNAL MEDICINE

## 2024-10-07 PROCEDURE — 3078F DIAST BP <80 MM HG: CPT | Mod: CPTII,S$GLB,, | Performed by: INTERNAL MEDICINE

## 2024-10-07 PROCEDURE — 3008F BODY MASS INDEX DOCD: CPT | Mod: CPTII,S$GLB,, | Performed by: INTERNAL MEDICINE

## 2024-10-07 NOTE — PROGRESS NOTES
Hepatology Follow Up Note    Referring provider: No ref. provider found  PCP: Enrique Graves MD (Inactive)    Chief complaint: follow up MASH cirrhosis     Last seen in clinic on: 6/28/24    Brief HPI:  Alicja Henao is a 69 y.o. female with decompensated MASH cirrhosis complicated by hepatic encephalopathy, obesity (BMI 37), HTN, HLD, DM Type 2, RAÚL, CKD, lymphedema, venostasis leg wounds, chronic pain syndrome,  who presents for scheduled follow up.    Interval Events:  - Feels ok. Accompanied by her , Lloyd.   - Feels foggy, somnolent. Taking lactulose once a week and having ~1 BM a day. Taking Xifaxan BID.  - Taking Coreg BID.   - Currently not on diuretics. Denies prior paracentesis.   - The patient denies abdominal distension, jaundice, gross GI bleeding.  - This is the extent of the patient's complaints at this time.      Past Medical History:   Diagnosis Date    Chronic back pain 07/07/2018    Cirrhosis 07/07/2018    Diabetes mellitus        Past Surgical History:   Procedure Laterality Date    BACK SURGERY      ESOPHAGOGASTRODUODENOSCOPY N/A 8/5/2024    Procedure: EGD (ESOPHAGOGASTRODUODENOSCOPY);  Surgeon: Maria De Jesus Hayes MD;  Location: [facility];  Service: Endoscopy;  Laterality: N/A;  Dr. Hayes, egd,Abdominal pain and variceal screening, standard prep,cirrhosis labs ordered, diabetic, instru to portal and mailed.  7/29/24- pc complete. DBM       Family History   Problem Relation Name Age of Onset    Esophageal cancer Neg Hx      Colon cancer Neg Hx         Social History     Tobacco Use    Smoking status: Former    Smokeless tobacco: Never   Substance Use Topics    Alcohol use: No    Drug use: No       Current Outpatient Medications   Medication Sig Dispense Refill    baclofen (LIORESAL) 20 MG tablet Take 20 mg by mouth 3 (three) times daily.      carvediloL (COREG) 3.125 MG tablet Take 3.125 mg by mouth daily as needed (as needed for bp greater than 150). prn      dapagliflozin  "propanediol (FARXIGA) 10 mg tablet Take 1 tablet by mouth once daily.      dicyclomine (BENTYL) 10 MG capsule Take 1 capsule (10 mg total) by mouth before meals and at bedtime as needed (for abdominal pain). 90 capsule 3    ergocalciferol (VITAMIN D2) 50,000 unit Cap Take 50,000 Units by mouth every 7 days.      ferrous gluconate (FERGON) 324 MG tablet Take 324 mg by mouth daily with breakfast.      hydrALAZINE (APRESOLINE) 50 MG tablet Take 1 tablet every day by oral route as needed.      lactulose (CHRONULAC) 10 gram/15 mL solution Take by mouth 3 (three) times daily.      montelukast (SINGULAIR) 10 mg tablet Take 1 tablet by mouth once daily.      ondansetron (ZOFRAN) 4 MG tablet Take by mouth.      RABEprazole (ACIPHEX) 20 mg tablet Take 1 tablet (20 mg total) by mouth once daily. 90 tablet 3    rifAXImin (XIFAXAN) 200 mg Tab Take 550 mg by mouth 3 (three) times daily.      rosuvastatin (CRESTOR) 40 MG Tab Take 1 tablet by mouth once daily.      TRESIBA FLEXTOUCH U-200 200 unit/mL (3 mL) insulin pen Inject 25 Units into the skin.      DULoxetine (CYMBALTA) 60 MG capsule Take 1 capsule by mouth once daily.      famotidine (PEPCID) 20 MG tablet Take 1 tablet (20 mg total) by mouth Daily. (Patient not taking: Reported on 10/7/2024) 90 tablet 3     No current facility-administered medications for this visit.       Review of patient's allergies indicates:   Allergen Reactions    Lyrica [pregabalin] Other (See Comments)     "it makes her crazy" per spouse    Mushroom     Protonix [pantoprazole] Other (See Comments)     "it makes her crazy" per spouse.            Vitals:    10/07/24 1459   BP: (!) 143/73   Pulse: 79   Weight: 89.5 kg (197 lb 6.8 oz)   Height: 5' 1" (1.549 m)     Body mass index is 37.3 kg/m².    Physical Exam  Constitutional:       Appearance: She is not toxic-appearing.   Eyes:      General: No scleral icterus.  Cardiovascular:      Rate and Rhythm: Normal rate.   Pulmonary:      Effort: Pulmonary " effort is normal.   Abdominal:      General: Abdomen is protuberant. There is no distension.      Palpations: There is no fluid wave.      Tenderness: There is no abdominal tenderness.   Musculoskeletal:         General: Swelling (Trace LE swelling.) present.   Skin:     Coloration: Skin is not jaundiced.   Neurological:      General: No focal deficit present.      Mental Status: She is alert.      Comments: No asterixis.   Psychiatric:         Thought Content: Thought content normal.         LABS: I personally reviewed pertinent laboratory findings.    Lab Results   Component Value Date    WBC 6.01 09/30/2024    HGB 9.8 (L) 09/30/2024    HCT 31.2 (L) 09/30/2024    MCV 93 09/30/2024     (L) 09/30/2024       Lab Results   Component Value Date     09/30/2024    K 5.1 09/30/2024     09/30/2024    CO2 27 09/30/2024    BUN 18 09/30/2024    CREATININE 1.6 (H) 09/30/2024    CALCIUM 9.3 09/30/2024    ANIONGAP 8 09/30/2024    ESTGFRAFRICA 42 (L) 04/10/2023    EGFRNONAA 49 (A) 09/17/2018       Lab Results   Component Value Date    ALT 21 09/30/2024    AST 28 09/30/2024    ALKPHOS 153 (H) 09/30/2024    BILITOT 0.2 09/30/2024       Lab Results   Component Value Date    HEPBCAB Reactive (A) 09/30/2024    HEPCAB Non-reactive 09/30/2024       Lab Results   Component Value Date    SMOOTHMUSCAB Negative 1:40 09/30/2024        MELD 3.0: 13 at 9/30/2024  9:29 AM  MELD-Na: 11 at 9/30/2024  9:29 AM  Calculated from:  Serum Creatinine: 1.6 mg/dL at 9/30/2024  9:29 AM  Serum Sodium: 141 mmol/L (Using max of 137 mmol/L) at 9/30/2024  9:29 AM  Total Bilirubin: 0.2 mg/dL (Using min of 1 mg/dL) at 9/30/2024  9:29 AM  Serum Albumin: 3.2 g/dL at 9/30/2024  9:29 AM  INR(ratio): 1 at 9/30/2024  9:29 AM  Age at listing (hypothetical): 69 years  Sex: Female at 9/30/2024  9:29 AM       IMAGING: I personally reviewed imaging studies.      Assessment: Alicja Henao is a 69 y.o. female with decompensated MASH cirrhosis  complicated by hepatic encephalopathy, obesity (BMI 37), HTN, HLD, DM Type 2, RAÚL, CKD, lymphedema, venostasis leg wounds, chronic pain syndrome,  who presents for scheduled follow up.    1. Decompensated liver disease    2. Metabolic dysfunction-associated steatohepatitis (MASH)    3. Cirrhosis of liver without ascites, unspecified hepatic cirrhosis type    4. Portal hypertension    5. Hepatic coma/encephalopathy    6. Thrombocytopenia, unspecified    7. Class 2 severe obesity due to excess calories with serious comorbidity and body mass index (BMI) of 37.0 to 37.9 in adult    8. Immune to hepatitis A    9. Immune to hepatitis B    10. Hepatitis B core antibody positive      #Decompensated MASH Cirrhosis:      Volume: Minimal to no ascites, trace LE edema. Not on diuretics. Denies prior paracentesis. Na 2g diet.  Infection: No concern at this time.   Bleeding: No hx EVB. EGD w/o EV on 8/5/24. Continue Coreg 3.125mg BID. Repeat EGD for EV not indicated given NSBB therapy.  Encephalopathy: Reeducated on need to take lactulose daily and titrated to 3-4 BM/day. Continue rifaximin 550mg BID. Limit sedating medications / narcotics.  Screening: AFP 4.4 (9/30/24), liver US w/o focal lesions (9/30/24). AFP and US q6 months.  Immunization: HAV immune, HBV immune.  Transplant: Low MELD. Hepatic decompensations are well-controlled with medical therapy.    Return to clinic in 6 months.    Dalia Petersen MD  Staff Physician  Hepatology and Liver Transplant  [facility]  [facility]

## 2024-10-08 ENCOUNTER — PATIENT MESSAGE (OUTPATIENT)
Dept: HEPATOLOGY | Facility: CLINIC | Age: 69
End: 2024-10-08
Payer: MEDICARE

## 2025-02-25 RX ORDER — DICYCLOMINE HYDROCHLORIDE 10 MG/1
10 CAPSULE ORAL
Qty: 90 CAPSULE | Refills: 3 | Status: SHIPPED | OUTPATIENT
Start: 2025-02-25

## 2025-03-14 ENCOUNTER — TELEPHONE (OUTPATIENT)
Dept: HEPATOLOGY | Facility: CLINIC | Age: 70
End: 2025-03-14
Payer: MEDICARE

## 2025-03-14 NOTE — TELEPHONE ENCOUNTER
"Patient was called and notified Dr. Petersen has no more openings for [facility], and her July schedule is not open yet. Pt was placed on wait list.    Jessi Sotomayor MA    ----- Message from Delon sent at 3/14/2025  3:56 PM CDT -----  Regarding: call back  Consult/Advisory:  Name Of Caller: Self Contact Preference?:[phone] What is the nature of the call?: Calling to speak w/  someone in regards to rescheduling appt for [facility] REQUESTING CALL BACK   Additional Notes:"Thank you for all that you do for our patients"  "

## 2025-04-07 ENCOUNTER — HOSPITAL ENCOUNTER (OUTPATIENT)
Dept: RADIOLOGY | Facility: HOSPITAL | Age: 70
Discharge: HOME | End: 2025-04-07
Attending: INTERNAL MEDICINE
Payer: MEDICARE

## 2025-04-07 ENCOUNTER — RESULTS FOLLOW-UP (OUTPATIENT)
Dept: TRANSPLANT | Facility: HOSPITAL | Age: 70
End: 2025-04-07

## 2025-04-07 DIAGNOSIS — K74.60 CIRRHOSIS OF LIVER WITHOUT ASCITES, UNSPECIFIED HEPATIC CIRRHOSIS TYPE: ICD-10-CM

## 2025-04-07 PROCEDURE — 76705 ECHO EXAM OF ABDOMEN: CPT | Mod: 26,,, | Performed by: RADIOLOGY

## 2025-04-07 PROCEDURE — 76705 ECHO EXAM OF ABDOMEN: CPT | Mod: TC

## 2025-04-11 NOTE — TELEPHONE ENCOUNTER
Patient called requesting a medication refill for her-  Rabeprazole (Aciphex) 20mg   Well-Child Checkup: 6 to 15 Years    Between ages 6 and 15, your child will grow and change a lot. It’s important to keep having yearly checkups so the healthcare provider can track this progress.  As your child enters puberty, he or she may become more Puberty is the stage when a child begins to develop sexually into an adult. It usually starts between 9 and 14 for girls, and between 12 and 16 for boys. Here is some of what you can expect when puberty begins:  · Acne and body odor.  Hormones that increase Today, kids are less active and eat more junk food than ever before. Your child is starting to make choices about what to eat and how active to be. You can’t always have the final say, but you can help your child develop healthy habits.  Here are some tips: · Serve and encourage healthy foods. Your child is making more food decisions on his or her own. All foods have a place in a balanced diet. Fruits, vegetables, lean meats, and whole grains should be eaten every day.  Save less healthy foods—like Polish frie · If your child has a cell phone or portable music player, make sure these are used safely and responsibly. Do not allow your child to talk on the phone, text, or listen to music with headphones while he or she is riding a bike or walking outdoors.  Remind · Set limits for the use of cell phones, the computer, and the Internet. Remind your child that you can check the web browser history and cell phone logs to know how these devices are being used.  Use parental controls and passwords to block access to [employer] o 5 servings of fruits and vegetables a day  o 4 servings of water a day  o 3 servings of low-fat dairy a day  o 2 or less hours of screen time a day  o 1 or more hours of physical activity a day    To help children live healthy active lives, parents can:

## 2025-04-14 RX ORDER — RABEPRAZOLE SODIUM 20 MG/1
20 TABLET, DELAYED RELEASE ORAL DAILY
Qty: 90 TABLET | Refills: 3 | Status: SHIPPED | OUTPATIENT
Start: 2025-04-14

## 2025-04-18 ENCOUNTER — NURSE TRIAGE (OUTPATIENT)
Dept: ADMINISTRATIVE | Facility: CLINIC | Age: 70
End: 2025-04-18
Payer: MEDICARE

## 2025-04-18 NOTE — TELEPHONE ENCOUNTER
"[facility] patient    [facility] nurse  asked nurse on call to contact pt.     Abdominal pain, below breast bone    Reports anytime she eats something, she feels like it comes back up. Denies she is choking    Able to walk and stand      Difficulty breathing with exertion      Dispo is go to ED now  Pt refusing ER now.  Reports that she understands that abdominal pain and SOB could be emergent or life threatening.   Still refusing dispo.   Reiterated dispo multiple times.       Reason for Disposition   [1] SEVERE pain (e.g., excruciating) AND [2] present > 1 hour   History of prior "blood clot" in leg or lungs (i.e., deep vein thrombosis, pulmonary embolism)    Additional Information   Negative: Shock suspected (e.g., cold/pale/clammy skin, too weak to stand, low BP, rapid pulse)   Negative: Difficult to awaken or acting confused (e.g., disoriented, slurred speech)   Negative: Passed out (e.g., fainted, lost consciousness, blacked out and was not responding)   Negative: Sounds like a life-threatening emergency to the triager   Negative: SEVERE difficulty breathing (e.g., struggling for each breath, speaks in single words)   Negative: [1] Breathing stopped AND [2] hasn't returned   Negative: Choking on something   Negative: Bluish (or gray) lips or face now   Negative: Difficult to awaken or acting confused (e.g., disoriented, slurred speech)   Negative: Passed out (e.g., fainted, lost consciousness, blacked out and was not responding)   Negative: Wheezing started suddenly after medicine, an allergic food or bee sting   Negative: Stridor (harsh sound while breathing in)   Negative: Slow, shallow and weak breathing   Negative: Sounds like a life-threatening emergency to the triager   Negative: [1] MODERATE difficulty breathing (e.g., speaks in phrases, SOB even at rest, pulse 100-120) AND [2] NEW-onset or WORSE than normal   Negative: Oxygen level (e.g., pulse oximetry) 90% or lower   Negative: Wheezing can " be heard across the room   Negative: Drooling or spitting out saliva (because can't swallow)    Protocols used: Abdominal Pain - Female-A-AH, Breathing Difficulty-A-AH

## 2025-04-21 ENCOUNTER — TELEPHONE (OUTPATIENT)
Dept: GASTROENTEROLOGY | Facility: CLINIC | Age: 70
End: 2025-04-21
Payer: MEDICARE

## 2025-04-21 NOTE — TELEPHONE ENCOUNTER
MA spoke with the patient  and he asked her how is her pain she replied that she is feeling much better.

## 2025-05-08 RX ORDER — FAMOTIDINE 20 MG/1
20 TABLET, FILM COATED ORAL DAILY
Qty: 90 TABLET | Refills: 3 | Status: SHIPPED | OUTPATIENT
Start: 2025-05-08

## 2025-06-20 ENCOUNTER — OFFICE VISIT (OUTPATIENT)
Dept: HEPATOLOGY | Facility: CLINIC | Age: 70
End: 2025-06-20
Payer: MEDICARE

## 2025-06-20 VITALS
SYSTOLIC BLOOD PRESSURE: 155 MMHG | TEMPERATURE: 97 F | HEART RATE: 72 BPM | RESPIRATION RATE: 18 BRPM | DIASTOLIC BLOOD PRESSURE: 67 MMHG | BODY MASS INDEX: 36.21 KG/M2 | WEIGHT: 191.81 LBS | OXYGEN SATURATION: 93 % | HEIGHT: 61 IN

## 2025-06-20 DIAGNOSIS — K74.60 DECOMPENSATED LIVER DISEASE: ICD-10-CM

## 2025-06-20 DIAGNOSIS — K72.90 DECOMPENSATED LIVER DISEASE: ICD-10-CM

## 2025-06-20 DIAGNOSIS — K72.91 HEPATIC COMA/ENCEPHALOPATHY: ICD-10-CM

## 2025-06-20 DIAGNOSIS — D69.6 THROMBOCYTOPENIA, UNSPECIFIED: ICD-10-CM

## 2025-06-20 DIAGNOSIS — E66.812 CLASS 2 SEVERE OBESITY DUE TO EXCESS CALORIES WITH SERIOUS COMORBIDITY AND BODY MASS INDEX (BMI) OF 36.0 TO 36.9 IN ADULT: ICD-10-CM

## 2025-06-20 DIAGNOSIS — K76.6 PORTAL HYPERTENSION: ICD-10-CM

## 2025-06-20 DIAGNOSIS — E66.01 CLASS 2 SEVERE OBESITY DUE TO EXCESS CALORIES WITH SERIOUS COMORBIDITY AND BODY MASS INDEX (BMI) OF 36.0 TO 36.9 IN ADULT: ICD-10-CM

## 2025-06-20 DIAGNOSIS — K75.81 METABOLIC DYSFUNCTION-ASSOCIATED STEATOHEPATITIS (MASH): ICD-10-CM

## 2025-06-20 PROCEDURE — 99999 PR PBB SHADOW E&M-EST. PATIENT-LVL V: CPT | Mod: PBBFAC,,, | Performed by: INTERNAL MEDICINE

## 2025-06-20 RX ORDER — NITROGLYCERIN 0.4 MG/1
TABLET SUBLINGUAL
COMMUNITY

## 2025-06-20 RX ORDER — ALBUTEROL SULFATE 90 UG/1
2 INHALANT RESPIRATORY (INHALATION) EVERY 6 HOURS PRN
COMMUNITY
Start: 2024-11-11 | End: 2025-11-11

## 2025-06-20 RX ORDER — PEN NEEDLE, DIABETIC 32GX 5/32"
NEEDLE, DISPOSABLE MISCELLANEOUS
COMMUNITY

## 2025-06-20 RX ORDER — MORPHINE SULFATE 15 MG/1
15 TABLET, FILM COATED, EXTENDED RELEASE ORAL EVERY 8 HOURS PRN
COMMUNITY

## 2025-06-20 NOTE — PROGRESS NOTES
Hepatology Follow Up Note    Referring provider: No ref. provider found  PCP: Enrique Graves MD (Inactive)    Chief complaint: follow up MASH cirrhosis     Last seen in clinic on: 10/7/24    Brief HPI:  Alicja Henao is a 70 y.o. 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    Interval Events:  - Feels well. Accompanied by her , Lloyd.   - Taking lactulose once a week and having ~2-3 BM a day. Taking Xifaxan BID.  - Continues on Coreg BID.   - Currently not on diuretics. Denies prior paracentesis.   - The patient denies abdominal distension, jaundice, gross GI bleeding.  - No ED visits, hospitalizations.  - This is the extent of the patient's complaints at this time.    Past Medical History:   Diagnosis Date    Chronic back pain 07/07/2018    Cirrhosis 07/07/2018    Diabetes mellitus        Past Surgical History:   Procedure Laterality Date    BACK SURGERY      ESOPHAGOGASTRODUODENOSCOPY N/A 8/5/2024    Procedure: EGD (ESOPHAGOGASTRODUODENOSCOPY);  Surgeon: Maria De Jesus Hayes MD;  Location: [facility];  Service: Endoscopy;  Laterality: N/A;  Dr. Hayes, egd,Abdominal pain and variceal screening, standard prep,cirrhosis labs ordered, diabetic, instru to portal and mailed.  7/29/24- pc complete. DBM       Family History   Problem Relation Name Age of Onset    Esophageal cancer Neg Hx      Colon cancer Neg Hx         Social History     Tobacco Use    Smoking status: Former    Smokeless tobacco: Never   Substance Use Topics    Alcohol use: No    Drug use: No       Current Outpatient Medications   Medication Sig Dispense Refill    albuterol (PROVENTIL/VENTOLIN HFA) 90 mcg/actuation inhaler Inhale 2 puffs into the lungs every 6 (six) hours as needed.      baclofen (LIORESAL) 20 MG tablet Take 20 mg by mouth 3 (three) times  "daily.      BD KAREN 2ND GEN PEN NEEDLE 32 gauge x 5/32" Ndle SMARTSIG:pre-filled pen syringe injection Daily      carvediloL (COREG) 3.125 MG tablet Take 3.125 mg by mouth daily as needed (as needed for bp greater than 150). prn      dapagliflozin propanediol (FARXIGA) 10 mg tablet Take 1 tablet by mouth once daily.      dicyclomine (BENTYL) 10 MG capsule Take 1 capsule (10 mg total) by mouth before meals and at bedtime as needed (for abdominal pain). 90 capsule 3    ergocalciferol (VITAMIN D2) 50,000 unit Cap Take 50,000 Units by mouth every 7 days.      famotidine (PEPCID) 20 MG tablet Take 1 tablet (20 mg total) by mouth Daily. 90 tablet 3    ferrous gluconate (FERGON) 324 MG tablet Take 324 mg by mouth daily with breakfast.      hydrALAZINE (APRESOLINE) 50 MG tablet Take 1 tablet every day by oral route as needed.      lactulose (CHRONULAC) 10 gram/15 mL solution Take by mouth 3 (three) times daily.      montelukast (SINGULAIR) 10 mg tablet Take 1 tablet by mouth once daily.      morphine (MS CONTIN) 15 MG 12 hr tablet Take 15 mg by mouth every 8 (eight) hours as needed.      nitroGLYCERIN (NITROSTAT) 0.4 MG SL tablet Sublingual for 10      ondansetron (ZOFRAN) 4 MG tablet Take by mouth.      RABEprazole (ACIPHEX) 20 mg tablet Take 1 tablet (20 mg total) by mouth once daily. 90 tablet 3    rifAXImin (XIFAXAN) 200 mg Tab Take 550 mg by mouth 3 (three) times daily.      rosuvastatin (CRESTOR) 40 MG Tab Take 1 tablet by mouth once daily.      TRESIBA FLEXTOUCH U-200 200 unit/mL (3 mL) insulin pen Inject 25 Units into the skin.      DULoxetine (CYMBALTA) 60 MG capsule Take 1 capsule by mouth once daily.       No current facility-administered medications for this visit.       Review of patient's allergies indicates:   Allergen Reactions    Mushroom combination no.1 Swelling    Lyrica [pregabalin] Other (See Comments)     "it makes her crazy" per spouse    Mushroom     Protonix [pantoprazole] Other (See Comments)     " ""it makes her crazy" per spouse.            Vitals:    06/20/25 0835   BP: (!) 155/67   Pulse: 72   Resp: 18   Temp: 96.6 °F (35.9 °C)   TempSrc: Oral   SpO2: (!) 93%   Weight: 87 kg (191 lb 12.8 oz)   Height: 5' 1" (1.549 m)   Body mass index is 36.24 kg/m².    Physical Exam  Constitutional:       Appearance: She is not toxic-appearing.   Eyes:      General: No scleral icterus.  Cardiovascular:      Rate and Rhythm: Normal rate.   Pulmonary:      Effort: Pulmonary effort is normal.   Abdominal:      General: Abdomen is protuberant. There is no distension.      Palpations: Abdomen is soft. There is no fluid wave.      Tenderness: There is abdominal tenderness.   Musculoskeletal:         General: Swelling (Trace LE swelling.) present.   Skin:     Coloration: Skin is not jaundiced.   Neurological:      General: No focal deficit present.      Mental Status: She is alert.      Comments: No asterixis.   Psychiatric:         Thought Content: Thought content normal.         LABS: I personally reviewed pertinent laboratory findings.    Lab Results   Component Value Date    WBC 4.87 04/07/2025    HGB 9.2 (L) 04/07/2025    HCT 29.2 (L) 04/07/2025    MCV 95 04/07/2025     (L) 04/07/2025       Lab Results   Component Value Date     06/17/2025    K 4.0 06/17/2025     04/07/2025    CO2 28 06/17/2025    BUN 15 06/17/2025    CREATININE 1.38 (H) 06/17/2025    CALCIUM 8.6 06/17/2025    ANIONGAP 8 04/09/2025    ESTGFRAFRICA 56 (A) 09/17/2018    EGFRNONAA 49 (A) 09/17/2018       Lab Results   Component Value Date    ALT 16 06/17/2025    AST 25 06/17/2025    ALKPHOS 126 06/17/2025    BILITOT 0.4 06/17/2025       Lab Results   Component Value Date    HEPBCAB Reactive (A) 09/30/2024    HEPCAB Non-reactive 09/30/2024       Lab Results   Component Value Date    SMOOTHMUSCAB Negative 1:40 09/30/2024        MELD 3.0: 10 at 4/9/2025  2:06 PM  MELD-Na: 9 at 4/9/2025  2:06 PM  Calculated from:  Serum Creatinine: 1.29 mg/dL at " 4/9/2025  2:06 PM  Serum Sodium: 142 mmol/L (Using max of 137 mmol/L) at 4/9/2025  2:06 PM  Total Bilirubin: 0.5 mg/dL (Using min of 1 mg/dL) at 4/9/2025  2:06 PM  Serum Albumin: 3.4 g/dL at 4/9/2025  2:06 PM  INR(ratio): 1 at 4/7/2025  8:22 AM  Age at listing (hypothetical): 70 years  Sex: Female at 4/9/2025  2:06 PM       IMAGING: I personally reviewed imaging studies.      Assessment: Alicja Henao is a 70 y.o. female with decompensated MASH cirrhosis complicated by hepatic encephalopathy, obesity (BMI 36), HTN, HLD, DM Type 2, RAÚL, CKD, lymphedema, venostasis leg wounds, chronic pain syndrome,  who presents for scheduled follow up.    1. Decompensated liver disease    2. Metabolic dysfunction-associated steatohepatitis (MASH)    3. Portal hypertension    4. Hepatic coma/encephalopathy    5. Thrombocytopenia, unspecified    6. Class 2 severe obesity due to excess calories with serious comorbidity and body mass index (BMI) of 36.0 to 36.9 in adult      #Decompensated MASH Cirrhosis:      Volume: Minimal to no ascites, trace LE edema. Not on diuretics. Denies prior paracentesis. Na 2g diet.  Infection: No concern at this time.   Bleeding: No hx EVB. EGD w/o EV on 8/5/24. Continue Coreg 3.125mg BID.   Encephalopathy: Reeducated on need to take lactulose daily and titrated to 3-4 BM/day. Continue rifaximin 550mg BID. Limit sedating medications / narcotics.  Screening: AFP 5.7 (4/7/25), liver US w/o mention of focal lesions (4/7/2025). AFP and US q6 months.  Immunization: HAV immune, HBV immune.  Transplant: Low MELD. Hepatic decompensations are well-controlled with medical therapy.     Return to clinic in Jan 2026.    Dalia Petersen MD  Staff Physician  Hepatology and Liver Transplant  [facility]  [facility] [place]